# Patient Record
Sex: MALE | Race: WHITE | Employment: FULL TIME | URBAN - METROPOLITAN AREA
[De-identification: names, ages, dates, MRNs, and addresses within clinical notes are randomized per-mention and may not be internally consistent; named-entity substitution may affect disease eponyms.]

---

## 2021-04-21 ENCOUNTER — APPOINTMENT (OUTPATIENT)
Dept: GENERAL RADIOLOGY | Facility: CLINIC | Age: 41
DRG: 871 | End: 2021-04-21
Attending: PHYSICIAN ASSISTANT
Payer: COMMERCIAL

## 2021-04-21 ENCOUNTER — HOSPITAL ENCOUNTER (INPATIENT)
Facility: CLINIC | Age: 41
LOS: 5 days | Discharge: HOME | DRG: 871 | End: 2021-04-26
Attending: PHYSICIAN ASSISTANT | Admitting: HOSPITALIST
Payer: COMMERCIAL

## 2021-04-21 ENCOUNTER — APPOINTMENT (OUTPATIENT)
Dept: CT IMAGING | Facility: CLINIC | Age: 41
DRG: 871 | End: 2021-04-21
Attending: HOSPITALIST
Payer: COMMERCIAL

## 2021-04-21 DIAGNOSIS — I26.94 MULTIPLE SUBSEGMENTAL PULMONARY EMBOLI WITHOUT ACUTE COR PULMONALE (H): Primary | ICD-10-CM

## 2021-04-21 DIAGNOSIS — U07.1 2019 NOVEL CORONAVIRUS DISEASE (COVID-19): ICD-10-CM

## 2021-04-21 DIAGNOSIS — R09.02 HYPOXIA: ICD-10-CM

## 2021-04-21 LAB
ALBUMIN SERPL-MCNC: 3.2 G/DL (ref 3.4–5)
ALP SERPL-CCNC: 63 U/L (ref 40–150)
ALT SERPL W P-5'-P-CCNC: 52 U/L (ref 0–70)
ANION GAP SERPL CALCULATED.3IONS-SCNC: 6 MMOL/L (ref 3–14)
AST SERPL W P-5'-P-CCNC: 45 U/L (ref 0–45)
BASE EXCESS BLDV CALC-SCNC: 5.5 MMOL/L
BASOPHILS # BLD AUTO: 0 10E9/L (ref 0–0.2)
BASOPHILS NFR BLD AUTO: 0 %
BILIRUB SERPL-MCNC: 0.5 MG/DL (ref 0.2–1.3)
BUN SERPL-MCNC: 10 MG/DL (ref 7–30)
CALCIUM SERPL-MCNC: 8.6 MG/DL (ref 8.5–10.1)
CHLORIDE SERPL-SCNC: 102 MMOL/L (ref 94–109)
CO2 SERPL-SCNC: 30 MMOL/L (ref 20–32)
CREAT SERPL-MCNC: 0.78 MG/DL (ref 0.66–1.25)
CRP SERPL-MCNC: 47.5 MG/L (ref 0–8)
D DIMER PPP FEU-MCNC: 4.8 UG/ML FEU (ref 0–0.5)
DIFFERENTIAL METHOD BLD: ABNORMAL
EOSINOPHIL # BLD AUTO: 0 10E9/L (ref 0–0.7)
EOSINOPHIL NFR BLD AUTO: 0 %
ERYTHROCYTE [DISTWIDTH] IN BLOOD BY AUTOMATED COUNT: 12.8 % (ref 10–15)
ERYTHROCYTE [DISTWIDTH] IN BLOOD BY AUTOMATED COUNT: 12.8 % (ref 10–15)
GFR SERPL CREATININE-BSD FRML MDRD: >90 ML/MIN/{1.73_M2}
GLUCOSE SERPL-MCNC: 111 MG/DL (ref 70–99)
HCO3 BLDV-SCNC: 32 MMOL/L (ref 21–28)
HCT VFR BLD AUTO: 47.4 % (ref 40–53)
HCT VFR BLD AUTO: 47.8 % (ref 40–53)
HGB BLD-MCNC: 15.3 G/DL (ref 13.3–17.7)
HGB BLD-MCNC: 15.6 G/DL (ref 13.3–17.7)
INTERPRETATION ECG - MUSE: NORMAL
LACTATE BLD-SCNC: 0.9 MMOL/L (ref 0.7–2)
LACTATE BLD-SCNC: NORMAL MMOL/L (ref 0.7–2)
LYMPHOCYTES # BLD AUTO: 0.8 10E9/L (ref 0.8–5.3)
LYMPHOCYTES NFR BLD AUTO: 23 %
MCH RBC QN AUTO: 28.9 PG (ref 26.5–33)
MCH RBC QN AUTO: 29 PG (ref 26.5–33)
MCHC RBC AUTO-ENTMCNC: 32.3 G/DL (ref 31.5–36.5)
MCHC RBC AUTO-ENTMCNC: 32.6 G/DL (ref 31.5–36.5)
MCV RBC AUTO: 89 FL (ref 78–100)
MCV RBC AUTO: 90 FL (ref 78–100)
MONOCYTES # BLD AUTO: 0.4 10E9/L (ref 0–1.3)
MONOCYTES NFR BLD AUTO: 11 %
NEUTROPHILS # BLD AUTO: 2.3 10E9/L (ref 1.6–8.3)
NEUTROPHILS NFR BLD AUTO: 66 %
O2/TOTAL GAS SETTING VFR VENT: ABNORMAL %
PCO2 BLDV: 50 MM HG (ref 40–50)
PH BLDV: 7.41 PH (ref 7.32–7.43)
PLATELET # BLD AUTO: 143 10E9/L (ref 150–450)
PLATELET # BLD AUTO: 146 10E9/L (ref 150–450)
PLATELET # BLD EST: ABNORMAL 10*3/UL
PO2 BLDV: 22 MM HG (ref 25–47)
POTASSIUM SERPL-SCNC: 3.2 MMOL/L (ref 3.4–5.3)
POTASSIUM SERPL-SCNC: 3.6 MMOL/L (ref 3.4–5.3)
PROT SERPL-MCNC: 7.7 G/DL (ref 6.8–8.8)
RBC # BLD AUTO: 5.28 10E12/L (ref 4.4–5.9)
RBC # BLD AUTO: 5.4 10E12/L (ref 4.4–5.9)
RBC MORPH BLD: ABNORMAL
SODIUM SERPL-SCNC: 138 MMOL/L (ref 133–144)
TROPONIN I SERPL-MCNC: <0.015 UG/L (ref 0–0.04)
WBC # BLD AUTO: 3.2 10E9/L (ref 4–11)
WBC # BLD AUTO: 3.5 10E9/L (ref 4–11)

## 2021-04-21 PROCEDURE — 250N000009 HC RX 250: Performed by: HOSPITALIST

## 2021-04-21 PROCEDURE — 99223 1ST HOSP IP/OBS HIGH 75: CPT | Mod: AI | Performed by: HOSPITALIST

## 2021-04-21 PROCEDURE — 85379 FIBRIN DEGRADATION QUANT: CPT | Performed by: PHYSICIAN ASSISTANT

## 2021-04-21 PROCEDURE — 99285 EMERGENCY DEPT VISIT HI MDM: CPT | Mod: 25

## 2021-04-21 PROCEDURE — 84132 ASSAY OF SERUM POTASSIUM: CPT | Performed by: HOSPITALIST

## 2021-04-21 PROCEDURE — 250N000011 HC RX IP 250 OP 636: Performed by: HOSPITALIST

## 2021-04-21 PROCEDURE — 83605 ASSAY OF LACTIC ACID: CPT | Performed by: PHYSICIAN ASSISTANT

## 2021-04-21 PROCEDURE — 96374 THER/PROPH/DIAG INJ IV PUSH: CPT

## 2021-04-21 PROCEDURE — 86140 C-REACTIVE PROTEIN: CPT | Performed by: PHYSICIAN ASSISTANT

## 2021-04-21 PROCEDURE — 85027 COMPLETE CBC AUTOMATED: CPT | Performed by: HOSPITALIST

## 2021-04-21 PROCEDURE — 82803 BLOOD GASES ANY COMBINATION: CPT | Performed by: PHYSICIAN ASSISTANT

## 2021-04-21 PROCEDURE — 120N000001 HC R&B MED SURG/OB

## 2021-04-21 PROCEDURE — 250N000011 HC RX IP 250 OP 636: Performed by: EMERGENCY MEDICINE

## 2021-04-21 PROCEDURE — 84484 ASSAY OF TROPONIN QUANT: CPT | Performed by: PHYSICIAN ASSISTANT

## 2021-04-21 PROCEDURE — 93005 ELECTROCARDIOGRAM TRACING: CPT

## 2021-04-21 PROCEDURE — 71275 CT ANGIOGRAPHY CHEST: CPT

## 2021-04-21 PROCEDURE — 71045 X-RAY EXAM CHEST 1 VIEW: CPT

## 2021-04-21 PROCEDURE — XW033E5 INTRODUCTION OF REMDESIVIR ANTI-INFECTIVE INTO PERIPHERAL VEIN, PERCUTANEOUS APPROACH, NEW TECHNOLOGY GROUP 5: ICD-10-PCS | Performed by: HOSPITALIST

## 2021-04-21 PROCEDURE — 258N000003 HC RX IP 258 OP 636: Performed by: HOSPITALIST

## 2021-04-21 PROCEDURE — 80053 COMPREHEN METABOLIC PANEL: CPT | Performed by: PHYSICIAN ASSISTANT

## 2021-04-21 PROCEDURE — 36415 COLL VENOUS BLD VENIPUNCTURE: CPT | Performed by: HOSPITALIST

## 2021-04-21 PROCEDURE — 85025 COMPLETE CBC W/AUTO DIFF WBC: CPT | Performed by: PHYSICIAN ASSISTANT

## 2021-04-21 PROCEDURE — 250N000013 HC RX MED GY IP 250 OP 250 PS 637: Performed by: EMERGENCY MEDICINE

## 2021-04-21 RX ORDER — ACETAMINOPHEN 325 MG/1
650 TABLET ORAL EVERY 4 HOURS PRN
Status: DISCONTINUED | OUTPATIENT
Start: 2021-04-21 | End: 2021-04-26 | Stop reason: HOSPADM

## 2021-04-21 RX ORDER — ALBUTEROL SULFATE 90 UG/1
2 AEROSOL, METERED RESPIRATORY (INHALATION) EVERY 6 HOURS PRN
Status: DISCONTINUED | OUTPATIENT
Start: 2021-04-21 | End: 2021-04-26 | Stop reason: HOSPADM

## 2021-04-21 RX ORDER — ONDANSETRON 4 MG/1
4 TABLET, ORALLY DISINTEGRATING ORAL EVERY 6 HOURS PRN
Status: DISCONTINUED | OUTPATIENT
Start: 2021-04-21 | End: 2021-04-26 | Stop reason: HOSPADM

## 2021-04-21 RX ORDER — GUAIFENESIN 200 MG/1
200 TABLET ORAL EVERY 4 HOURS PRN
Status: DISCONTINUED | OUTPATIENT
Start: 2021-04-21 | End: 2021-04-21

## 2021-04-21 RX ORDER — BENZONATATE 100 MG/1
100 CAPSULE ORAL 3 TIMES DAILY PRN
Status: DISCONTINUED | OUTPATIENT
Start: 2021-04-21 | End: 2021-04-26 | Stop reason: HOSPADM

## 2021-04-21 RX ORDER — ONDANSETRON 2 MG/ML
4 INJECTION INTRAMUSCULAR; INTRAVENOUS EVERY 6 HOURS PRN
Status: DISCONTINUED | OUTPATIENT
Start: 2021-04-21 | End: 2021-04-26 | Stop reason: HOSPADM

## 2021-04-21 RX ORDER — DEXAMETHASONE SODIUM PHOSPHATE 4 MG/ML
6 INJECTION, SOLUTION INTRA-ARTICULAR; INTRALESIONAL; INTRAMUSCULAR; INTRAVENOUS; SOFT TISSUE ONCE
Status: COMPLETED | OUTPATIENT
Start: 2021-04-21 | End: 2021-04-21

## 2021-04-21 RX ORDER — TOPIRAMATE 50 MG/1
50 TABLET, FILM COATED ORAL 2 TIMES DAILY
COMMUNITY
Start: 2021-03-18

## 2021-04-21 RX ORDER — POTASSIUM CHLORIDE 1500 MG/1
40 TABLET, EXTENDED RELEASE ORAL ONCE
Status: COMPLETED | OUTPATIENT
Start: 2021-04-21 | End: 2021-04-21

## 2021-04-21 RX ORDER — IOPAMIDOL 755 MG/ML
500 INJECTION, SOLUTION INTRAVASCULAR ONCE
Status: COMPLETED | OUTPATIENT
Start: 2021-04-21 | End: 2021-04-21

## 2021-04-21 RX ORDER — DEXAMETHASONE SODIUM PHOSPHATE 4 MG/ML
6 INJECTION, SOLUTION INTRA-ARTICULAR; INTRALESIONAL; INTRAMUSCULAR; INTRAVENOUS; SOFT TISSUE EVERY 24 HOURS
Status: DISCONTINUED | OUTPATIENT
Start: 2021-04-22 | End: 2021-04-24

## 2021-04-21 RX ORDER — POTASSIUM CHLORIDE 1500 MG/1
40 TABLET, EXTENDED RELEASE ORAL ONCE
Status: DISCONTINUED | OUTPATIENT
Start: 2021-04-21 | End: 2021-04-21

## 2021-04-21 RX ORDER — LOSARTAN POTASSIUM 50 MG/1
50 TABLET ORAL DAILY
COMMUNITY
Start: 2021-03-18

## 2021-04-21 RX ORDER — ALBUTEROL SULFATE 90 UG/1
1-2 AEROSOL, METERED RESPIRATORY (INHALATION) EVERY 4 HOURS PRN
COMMUNITY
Start: 2021-04-19

## 2021-04-21 RX ORDER — HEPARIN SODIUM 10000 [USP'U]/100ML
0-5000 INJECTION, SOLUTION INTRAVENOUS CONTINUOUS
Status: DISCONTINUED | OUTPATIENT
Start: 2021-04-21 | End: 2021-04-25

## 2021-04-21 RX ORDER — LIDOCAINE 40 MG/G
CREAM TOPICAL
Status: DISCONTINUED | OUTPATIENT
Start: 2021-04-21 | End: 2021-04-26 | Stop reason: HOSPADM

## 2021-04-21 RX ADMIN — SODIUM CHLORIDE 90 ML: 9 INJECTION, SOLUTION INTRAVENOUS at 17:35

## 2021-04-21 RX ADMIN — REMDESIVIR 200 MG: 100 INJECTION, POWDER, LYOPHILIZED, FOR SOLUTION INTRAVENOUS at 17:47

## 2021-04-21 RX ADMIN — POTASSIUM CHLORIDE 40 MEQ: 1500 TABLET, EXTENDED RELEASE ORAL at 13:53

## 2021-04-21 RX ADMIN — SODIUM CHLORIDE 50 ML: 9 INJECTION, SOLUTION INTRAVENOUS at 17:50

## 2021-04-21 RX ADMIN — IOPAMIDOL 90 ML: 755 INJECTION, SOLUTION INTRAVENOUS at 17:34

## 2021-04-21 RX ADMIN — DEXAMETHASONE SODIUM PHOSPHATE 6 MG: 4 INJECTION, SOLUTION INTRAMUSCULAR; INTRAVENOUS at 13:53

## 2021-04-21 RX ADMIN — HEPARIN SODIUM 1800 UNITS/HR: 10000 INJECTION, SOLUTION INTRAVENOUS at 22:18

## 2021-04-21 RX ADMIN — ENOXAPARIN SODIUM 40 MG: 40 INJECTION SUBCUTANEOUS at 17:10

## 2021-04-21 ASSESSMENT — ENCOUNTER SYMPTOMS
CHILLS: 0
APPETITE CHANGE: 0
FEVER: 0
SHORTNESS OF BREATH: 1

## 2021-04-21 ASSESSMENT — MIFFLIN-ST. JEOR
SCORE: 2517.42
SCORE: 2511.98

## 2021-04-21 ASSESSMENT — ACTIVITIES OF DAILY LIVING (ADL)
ADLS_ACUITY_SCORE: 15
ADLS_ACUITY_SCORE: 15

## 2021-04-21 NOTE — PHARMACY-ADMISSION MEDICATION HISTORY
Admission medication history interview status for this patient is complete. See [facility] admission navigator for allergy information, prior to admission medications and immunization status.     Medication history interview source(s):Patient  Medication history resources (including written lists, pill bottles, clinic record):None    Changes made to PTA medication list:  Added: all meds  Deleted: none  Changed: none    Actions taken by pharmacist (provider contacted, etc):None     Additional medication history information:None    Medication reconciliation/reorder completed by provider prior to medication history? No    Prior to Admission medications    Medication Sig Last Dose Taking? Auth Provider   albuterol (PROAIR HFA/PROVENTIL HFA/VENTOLIN HFA) 108 (90 Base) MCG/ACT inhaler Inhale 1-2 puffs into the lungs every 4 hours as needed 4/21/2021 at am Yes Unknown, Entered By History   losartan (COZAAR) 50 MG tablet Take 50 mg by mouth daily 4/20/2021 Yes Unknown, Entered By History   topiramate (TOPAMAX) 50 MG tablet Take 50 mg by mouth 2 times daily Past Week Yes Unknown, Entered By History

## 2021-04-21 NOTE — ED TRIAGE NOTES
Pt arrives to the ED due to low o2 sats at home. Pt states he tested positive for COVID 1 week ago. Wife states that his O2 sats at home were around 84% at rest. Pt does endorse feeling more SOB. States no fevers for past 2 days. Denies h/o of asthma or COPD. Denies chest pain.

## 2021-04-21 NOTE — PLAN OF CARE
Pt admitted from ER on a cart. Got up off the cart and walked to the bed. Dyspnea noted. BP high, afeb, denied pain. Oriented to room, equipment, orders and floor routines. Covid meds ordered. Hospitalist did order a chest CT since pt recently drove to [place] and back. D-dimer elevated as well. Pt has not eaten yet today and did not take any of his meds. He reported a 20lb wt loss in the last week. O2 increased to 3L from 2L NC with continuous pulse ox at 90 to get to 92%.

## 2021-04-21 NOTE — ED NOTES
[facility]  ED Nurse Handoff Report    Cooper Adair is a 40 year old male   ED Chief complaint: Covid Concern  . ED Diagnosis:   Final diagnoses:   2019 novel coronavirus disease (COVID-19)   Hypoxia     Allergies: No Known Allergies    Code Status: Full Code  Activity level - Baseline/Home:  Independent. Activity Level - Current:   Stand by Assist. Lift room needed: No. Bariatric: No   Needed: No   Isolation: Yes. Infection: COVID positive.  .     Vital Signs:   Vitals:    04/21/21 1149 04/21/21 1230 04/21/21 1245   BP: (!) 153/98 (!) 158/102 (!) 155/104   Pulse: 104 91 98   Resp: (!) 36     Temp: 97.6  F (36.4  C)     TempSrc: Oral     SpO2: 92% (!) 88% 94%   Weight: (!) 151 kg (333 lb)         Cardiac Rhythm:  ,      Pain level:    Patient confused: No. Patient Falls Risk: Yes.   Elimination Status: Due to void.   Patient Report - Initial Complaint: SOB, COVID +. Focused Assessment: Presents to ED with hypoxia and increasing SOB. Pt tested positive for COVID one week ago. Oxygen saturations at home were mid-80s. Pt placed on 2L via NC and O2 saturations have now been 94-96%. Pt has no other complaints.    Tests Performed: Labs, EKG, xray. Abnormal Results:   Labs Ordered and Resulted from Time of ED Arrival Up to the Time of Departure from the ED   BLOOD GAS VENOUS - Abnormal; Notable for the following components:       Result Value    PO2 Venous 22 (*)     Bicarbonate Venous 32 (*)     All other components within normal limits   CBC WITH PLATELETS DIFFERENTIAL - Abnormal; Notable for the following components:    WBC 3.5 (*)     Platelet Count 143 (*)     All other components within normal limits   COMPREHENSIVE METABOLIC PANEL - Abnormal; Notable for the following components:    Potassium 3.2 (*)     Glucose 111 (*)     Albumin 3.2 (*)     All other components within normal limits   TROPONIN I   LACTIC ACID WHOLE BLOOD   LACTIC ACID WHOLE BLOOD   CRP INFLAMMATION   D DIMER  QUANTITATIVE   VITAL SIGNS   PULSE OXIMETRY NURSING   CARDIAC CONTINUOUS MONITORING   PERIPHERAL IV CATHETER   NOTIFY PHYSICIAN     XR Chest Port 1 View   Final Result   IMPRESSION: Single portable AP view of the chest was obtained.   Cardiomediastinal silhouette is within normal limits. Bilateral   basilar predominant pulmonary opacities, worrisome for infection   including atypical infection like COVID-19. No significant pleural   effusion or pneumothorax.       BETY RITCHIE MD        .   Treatments provided: See MAR.  Family Comments: No family here.   OBS brochure/video discussed/provided to patient:  N/A  ED Medications:   Medications   sodium chloride (PF) 0.9% PF flush 3 mL (has no administration in time range)   sodium chloride (PF) 0.9% PF flush 3 mL (has no administration in time range)   dexamethasone (DECADRON) injection 6 mg (6 mg Intravenous Given 4/21/21 1353)   potassium chloride ER (KLOR-CON M) CR tablet 40 mEq (40 mEq Oral Given 4/21/21 1353)     Drips infusing:  No  For the majority of the shift, the patient's behavior Green. Interventions performed were N/A.    Sepsis treatment initiated: No     Patient tested for COVID 19 prior to admission: NO - positive    ED Nurse Name/Phone Number: Adelaida Mccullough RN,   2:10 PM     RECEIVING UNIT ED HANDOFF REVIEW    Above ED Nurse Handoff Report was reviewed: Yes  Reviewed by: Renetta Carnes RN on April 21, 2021 at 2:28 PM

## 2021-04-21 NOTE — ED PROVIDER NOTES
Emergency Department Attending Supervision Note  4/21/2021  1:47 PM      I evaluated this patient in conjunction with Chrissy Kong PA-C.    Briefly, this 40 year old man with HTN presented with shortness of breath. He tested positive for COVID-19 1 week ago. He has had worsening shortness of breath and home pulse oximeter today read 84% today at rest prompting his visit. He has not had fever in the last couple days. He denies chest pain. He has had no vomiting, but some diarrhea.     On my exam,   General: Well-developed and well-nourished. Well appearing middle aged  man. Cooperative.  Head:  Atraumatic.  Eyes:  Conjunctivae, lids, and sclerae are normal.  Neck:  Supple. Normal range of motion.  CV:  Regular rate and rhythm. Normal heart sounds with no murmurs, rubs, or gallops detected.  Resp:  No respiratory distress. Clear to auscultation bilaterally without decreased breath sounds, wheezing, rales, or rhonchi.  GI:  Soft. Non-distended. Non-tender.    MS:  Normal ROM.   Skin:  Warm. Non-diaphoretic. No pallor.  Neuro:  Awake. A&Ox3. Normal strength.  Psych: Normal mood and affect. Normal speech.  Vitals reviewed.    Results:  Abnormal values below only. See results review for all others.   Labs Ordered and Resulted from Time of ED Arrival Up to the Time of Departure from the ED   BLOOD GAS VENOUS - Abnormal; Notable for the following components:       Result Value    PO2 Venous 22 (*)     Bicarbonate Venous 32 (*)     All other components within normal limits   CBC WITH PLATELETS DIFFERENTIAL - Abnormal; Notable for the following components:    WBC 3.5 (*)     Platelet Count 143 (*)     All other components within normal limits   COMPREHENSIVE METABOLIC PANEL - Abnormal; Notable for the following components:    Potassium 3.2 (*)     Glucose 111 (*)     Albumin 3.2 (*)     All other components within normal limits   TROPONIN I   LACTIC ACID WHOLE BLOOD      XR Chest Port 1 View   Final Result    IMPRESSION: Single portable AP view of the chest was obtained.   Cardiomediastinal silhouette is within normal limits. Bilateral   basilar predominant pulmonary opacities, worrisome for infection   including atypical infection like COVID-19. No significant pleural   effusion or pneumothorax.       BETY RITCHIE MD        MDM:  This 40 year old man with COVID-19 presents with worsening dyspnea and home oximeter revealing hypoxia. He appears well but is hypoxic to 88% on room air here requiring supplemental oxygen. He is in no respiratory distress and is stable. His workup reveals evidence of COVID-19 pneumonia on CXR, as expected, without evidence of superimposed bacterial infection that would require antibiotics. He has leukopenia typical of COVID-19. There is no kidney injury, lactic acidosis, hepatitis, biliary obstruction, and only mild hypokalemia repleted with oral potassium. He was given Decadron and understands plan for admission. His EKG is without acute ST changes or arrhythmia and troponin is undetectable. Cooper was admitted to the hospitalist service by HEMALATHA Kong. The hospitalist will follow up on pending d-dimer. I answered all Cooper's questions prior to admission.    Diagnosis:  1. Hypoxic respiratory failure   2. COVID-19 PNA  3. Hypokalemia  4. Leukopenia    Maryann Cordova MD    Scribe Disclosure:  I, Orla Severson, am serving as a scribe at 1:48 PM on 4/21/2021 to document services personally performed by Maryann Cordova MD based on my observations and the provider's statements to me.          Maryann Cordova MD  04/22/21 6503

## 2021-04-21 NOTE — PLAN OF CARE
Pt up indep but very FAROOQ. 4 L NC. CT showed PE's. Covid cocktail except changing lovenox to heparin drip. Denies pain. Will continue to monitor.     Attempted to cover low K+ but order cancelled bc ER gave K+ replacement earlier today. Will recheck in morning.

## 2021-04-21 NOTE — H&P
[facility]    History and Physical  Hospitalist       Date of Admission:  4/21/2021    Assessment & Plan   Cooper Adair is a 40 year old male with a past medical history of JAREN and hypertension who presents with shortness of breath.    #Acute hypoxemic respiratory failure and sepsis secondary to Covid-19: Traveled to [place] about 2 weeks ago.  Had symptoms of generalized malaise that started 10 days PTA.  Patient tested positive for Covid-19 on 04/14.  Endorses shortness of breath that is been progressive.  Cough, generalized malaise.  He checked his oxygen saturation at home and was 84%.  Presented to the ED for evaluation.  -ED, patient afebrile, tachycardic and tachypneic.  Requiring 2 L on nasal cannula.  BMP showed only mild hypokalemia.  Troponin negative.  LFTs within normal limits.  VBG unremarkable.  CBC showed mild thrombocytopenia and leukopenia.  Chest x-ray showed bilateral opacities.  Patient given dexamethasone.  -Dexamethasone and remdesivir started on admission.  -Enoxaparin for ppx  -albuterol prn, tessalon and guaifenesin prn.    -Continuous pulse oximetry.  Monitor inflammatory markers.  I have added on CRP and d dimer.  -Maintain precautions    Addendum: D dimer is quite elevated at 4.8.  Given COVID-19 positive status in setting of long drive back from [place], will check CT PE.  If negative will check LE doppers to ensure to DVT.     Paged by radiology that CT PE showed moderate volume acute PE and changes consistent with COVID-19 preliminarily.  Will start full dose anticoagulation, pharm liason for DOAC coverage.  I will also order a TTE.  I did communicate results and plan with the patient, bedside nurse and pharmacy.     #JAREN: Continue home CPAP  #HTN: Continue home losartan    DVT Prophylaxis: Enoxaparin (Lovenox) SQ  Code Status: Full Code  Dispo: Admit to inpatient    Jeff Varghese MD    Primary Care Physician   Chrissy Maguire    Chief Complaint    SOB    History is obtained from the patient, patient's chart, ER physician    History of Present Illness   Cooper Adair is a 40 year old male with a past medical history of JAREN and hypertension who presents with shortness of breath.    Cooper recently got back from [place] about a week and a half ago or so.  He drove there and back.  He tracks with family.  He had symptoms of generalized malaise and fatigue that started about 10 days ago.  He tested positive for Covid-19 about 1 week ago.  He has had progressive shortness of breath, cough and generalized malaise.  He checked his oxygen saturations today and they were 84% at rest.  He presented to the ED for evaluation.  He denies any chest pain.  He has albuterol inhaler prescribed but no history of asthma or COPD.  No nausea vomiting or diarrhea.    In the ED, patient afebrile, tachycardic and tachypneic.  Requiring 2 L on nasal cannula.  BMP showed only mild hypokalemia.  Troponin negative.  LFTs within normal limits.  VBG unremarkable.  CBC showed mild thrombocytopenia and leukopenia.  Chest x-ray showed bilateral opacities.  Patient given dexamethasone.    Past Medical History    I have reviewed this patient's medical history and updated it with pertinent information if needed.   Obesity  JAREN  Hypertension  Pyogenic granuloma  Cellulitis    Past Surgical History   I have reviewed this patient's surgical history and updated it with pertinent information if needed.  None    Prior to Admission Medications   None   Albuterol  Cozaar  Topamax    Allergies   No Known Allergies    Social History   I have reviewed this patient's social history and updated it with pertinent information if needed. Cooper Adair    Nonsmoker, nondrinker. Lives with wife and kids.      Family History   I have reviewed this patient's family history and updated it with pertinent information if needed.   Noncontributory - DM and JAREN    Review of Systems   The 10 point  Review of Systems is negative other than noted in the HPI or here.     Physical Exam   Temp: 97.6  F (36.4  C) Temp src: Oral BP: (!) 155/104 Pulse: 98   Resp: (!) 36 SpO2: 94 % O2 Device: Nasal cannula Oxygen Delivery: 2 LPM  Vital Signs with Ranges  Temp:  [97.6  F (36.4  C)] 97.6  F (36.4  C)  Pulse:  [] 98  Resp:  [36] 36  BP: (153-158)/() 155/104  SpO2:  [88 %-94 %] 94 %  333 lbs 0 oz    Constitutional: Obese. Fatigued but conversational   HEENT: Normocephalic, MMM, no elevation of JVD noted  Respiratory: +tachypnea. Coarse at bases. No wheeze  Cardiovascular: Regular, no murmur  GI: BS+, NT, ND, no rebound or guarding  Lymph/Hematologic: No bruising. No cervical LAD  Skin: No rash  Musculoskeletal: Nl Tone, No edema noted  Neurologic: A&Ox3, Answers appropriately. CNII-XII intact. Moves all extremities. No tremor  Psychiatric: Calm    Data   Data reviewed today:  I personally reviewed   Recent Labs   Lab 04/21/21  1240   WBC 3.5*   HGB 15.3   MCV 90   *      POTASSIUM 3.2*   CHLORIDE 102   CO2 30   BUN 10   CR 0.78   ANIONGAP 6   CIRILO 8.6   *   ALBUMIN 3.2*   PROTTOTAL 7.7   BILITOTAL 0.5   ALKPHOS 63   ALT 52   AST 45   TROPI <0.015       Recent Results (from the past 24 hour(s))   XR Chest Port 1 View    Narrative    CHEST PORTABLE ONE VIEW    4/21/2021 12:55 PM     HISTORY: Dyspnea/SOB, known COVID +.    COMPARISON: None available.      Impression    IMPRESSION: Single portable AP view of the chest was obtained.  Cardiomediastinal silhouette is within normal limits. Bilateral  basilar predominant pulmonary opacities, worrisome for infection  including atypical infection like COVID-19. No significant pleural  effusion or pneumothorax.     BETY RITCHIE MD

## 2021-04-21 NOTE — ED PROVIDER NOTES
History   Chief Complaint:  Shortness of Breath      HPI   Cooper Adair is a 40 year old male with history of hypertension, JAREN, and positive COVID test 1 week ago who presents with shortness of breath. The patient reports O2 sats of 84% at rest today, with associated shortness of breath with exertion. He denies any fever or chills in the past 2 days. No chest pain. No history of asthma or COPD. States he has been eating and drinking okay. Proof of positive COVID test below.         Review of Systems   Constitutional: Negative for appetite change, chills and fever.   Respiratory: Positive for shortness of breath.    Cardiovascular: Negative for chest pain.   All other systems reviewed and are negative.      Allergies:  The patient has no known allergies.     Medications:  Albuterol  Cozaar  Topamax    Past Medical History:    Obesity  JAREN  Hypertension  Pyogenic granuloma  Cellulitis    Past Surgical History:    Appendectomy  Mass excision     Family History:    DM  Sleep apnea    Social History:  Patient presents alone.  Marital status:     Physical Exam     Patient Vitals for the past 24 hrs:   BP Temp Temp src Pulse Resp SpO2 Weight   04/21/21 1245 (!) 155/104 -- -- 98 -- 94 % --   04/21/21 1230 (!) 158/102 -- -- 91 -- (!) 88 % --   04/21/21 1149 (!) 153/98 97.6  F (36.4  C) Oral 104 (!) 36 92 % (!) 151 kg (333 lb)       Physical Exam  General: Sitting upright, well appearing.   Head:  Scalp is atraumatic.  Eyes:   Normal conjunctiva.   ENT:                                      Ears:  The external ears are normal.   Nose:  The external nose is normal.  Throat:  The oropharynx is normal. Mucus membranes are moist.                 Neck:  Normal range of motion.   CV:  Tachycardic rate. No murmur. 2+ radial pulses  Resp:  Course breath sounds bilaterally. Mild respiratory distress.   GI:  Abdomen is soft, no distension, no tenderness.   MS:  Normal range of motion. No acute deformities.    Skin:  Warm and dry. No rash.   Neuro:  Alert. Strength and sensation grossly intact.   Psych:  Awake. Alert.  Appropriate interactions.     Emergency Department Course     ECG  ECG taken at 1209, ECG read at 1219  NSR, Normal ECG   Rate 93 bpm. ME interval 148 ms. QRS duration 82 ms. QT/QTc 368/457 ms. P-R-T axes 60 32 51.     Imaging:    XR Chest Port 1 View:  Single portable AP view of the chest was obtained.   Cardiomediastinal silhouette is within normal limits. Bilateral   basilar predominant pulmonary opacities, worrisome for infection   including atypical infection like COVID-19. No significant pleural   effusion or pneumothorax, as per radiology.     Laboratory:    CBC: WBC: 3.5 (L), HGB: 15.3, PLT: 143 (L)  CMP: Glucose 111 (H), Potassium: 3.2 (L), Albumin: 3.2 (L), o/w WNL (Creatinine: 0.78)    Troponin (Collected 1240): <0.015  Lactic acid: Pending  D-dimer: pending   VBG: pH 7.41 / PCO2 50 / PO2 22 (L) / Bicarb 32 (H)    Emergency Department Course:    Reviewed:  I reviewed the patient's nursing notes, vitals, past medical records, Care Everywhere.     Assessments:  1218    I evaluated the patient and performed an exam as above.    1405    I updated the patient on results and discussed plan of care.    Consults:   1403    I spoke with Dr. Varghese of the Hospitalist service regarding patient's presentation, findings, and plan of care.    Interventions:  1353 Decadron, 6 mg, IV   Klor-con, 40 mEq, Oral     Disposition:  The patient was admitted to the hospital under the care of Dr. Varghese.     Impression & Plan       Medical Decision Making:  Cooper Adair is a 40 year old male presents emergency department after being diagnosed with COVID-19 virus 1 week ago with worsening shortness of breath and low O2 sats at home.  No chest pain.  Here, O2 sats in the high 80s.  No leukocytosis and chest x-ray with no evidence of lobar pneumonia to suggest bacterial pneumonia.  EKG with no ischemic changes.   Troponin negative.  He has no chest pain and I doubt acute coronary syndrome.  Pulmonary embolism considered, but I believe this is less likely.  Hospitalist will add on D-dimer and follow accordingly.  Patient required O2 via nasal cannula in the emergency department.  Dose of Decadron given here.  Potassium was a little low at 3.2 and dose of potassium given.  Given need for supplemental oxygen in the setting of COVID-19, will admit under the care of Dr. Simmons for further care and monitoring.  Patient agrees with this plan all questions and concerns addressed prior to admission.     Covid-19  Cooper Adair was evaluated during a global COVID-19 pandemic, which necessitated consideration that the patient might be at risk for infection with the SARS-CoV-2 virus that causes COVID-19.   Applicable protocols for evaluation were followed during the patient's care.   COVID-19 was considered as part of the patient's evaluation. The plan for testing is:  a test was obtained at a previous visit and reviewed & considered today.    Diagnosis:    ICD-10-CM    1. 2019 novel coronavirus disease (COVID-19)  U07.1 Lactic acid whole blood     Lactic acid whole blood   2. Hypoxia  R09.02        Scribe Disclosure:  I, Monisha Avila, am serving as a scribe at 12:18 PM on 4/21/2021 to document services personally performed by Chrissy Kong PA-C based on my observations and the provider's statements to me.      Chrissy Kong PA-C  04/21/21 0252

## 2021-04-22 ENCOUNTER — APPOINTMENT (OUTPATIENT)
Dept: CARDIOLOGY | Facility: CLINIC | Age: 41
DRG: 871 | End: 2021-04-22
Attending: HOSPITALIST
Payer: COMMERCIAL

## 2021-04-22 LAB
ANION GAP SERPL CALCULATED.3IONS-SCNC: 4 MMOL/L (ref 3–14)
BUN SERPL-MCNC: 13 MG/DL (ref 7–30)
CALCIUM SERPL-MCNC: 8.8 MG/DL (ref 8.5–10.1)
CHLORIDE SERPL-SCNC: 106 MMOL/L (ref 94–109)
CO2 SERPL-SCNC: 28 MMOL/L (ref 20–32)
CREAT SERPL-MCNC: 0.73 MG/DL (ref 0.66–1.25)
CRP SERPL-MCNC: 31.9 MG/L (ref 0–8)
ERYTHROCYTE [DISTWIDTH] IN BLOOD BY AUTOMATED COUNT: 13 % (ref 10–15)
GFR SERPL CREATININE-BSD FRML MDRD: >90 ML/MIN/{1.73_M2}
GLUCOSE SERPL-MCNC: 116 MG/DL (ref 70–99)
HCT VFR BLD AUTO: 48.3 % (ref 40–53)
HGB BLD-MCNC: 15.6 G/DL (ref 13.3–17.7)
MCH RBC QN AUTO: 29.2 PG (ref 26.5–33)
MCHC RBC AUTO-ENTMCNC: 32.3 G/DL (ref 31.5–36.5)
MCV RBC AUTO: 90 FL (ref 78–100)
PLATELET # BLD AUTO: 129 10E9/L (ref 150–450)
POTASSIUM SERPL-SCNC: 4.1 MMOL/L (ref 3.4–5.3)
RBC # BLD AUTO: 5.34 10E12/L (ref 4.4–5.9)
SODIUM SERPL-SCNC: 138 MMOL/L (ref 133–144)
UFH PPP CHRO-ACNC: 0.41 IU/ML
UFH PPP CHRO-ACNC: 0.43 IU/ML
UFH PPP CHRO-ACNC: 0.74 IU/ML
WBC # BLD AUTO: 3.2 10E9/L (ref 4–11)

## 2021-04-22 PROCEDURE — 93321 DOPPLER ECHO F-UP/LMTD STD: CPT | Mod: 26 | Performed by: INTERNAL MEDICINE

## 2021-04-22 PROCEDURE — 80048 BASIC METABOLIC PNL TOTAL CA: CPT | Performed by: HOSPITALIST

## 2021-04-22 PROCEDURE — 258N000003 HC RX IP 258 OP 636: Performed by: HOSPITALIST

## 2021-04-22 PROCEDURE — 85520 HEPARIN ASSAY: CPT | Performed by: HOSPITALIST

## 2021-04-22 PROCEDURE — 93321 DOPPLER ECHO F-UP/LMTD STD: CPT

## 2021-04-22 PROCEDURE — 94660 CPAP INITIATION&MGMT: CPT

## 2021-04-22 PROCEDURE — 120N000001 HC R&B MED SURG/OB

## 2021-04-22 PROCEDURE — 85520 HEPARIN ASSAY: CPT | Performed by: INTERNAL MEDICINE

## 2021-04-22 PROCEDURE — 36415 COLL VENOUS BLD VENIPUNCTURE: CPT | Performed by: INTERNAL MEDICINE

## 2021-04-22 PROCEDURE — 255N000002 HC RX 255 OP 636: Performed by: HOSPITALIST

## 2021-04-22 PROCEDURE — 86140 C-REACTIVE PROTEIN: CPT | Performed by: HOSPITALIST

## 2021-04-22 PROCEDURE — 250N000011 HC RX IP 250 OP 636: Performed by: HOSPITALIST

## 2021-04-22 PROCEDURE — 93325 DOPPLER ECHO COLOR FLOW MAPG: CPT | Mod: 26 | Performed by: INTERNAL MEDICINE

## 2021-04-22 PROCEDURE — 99233 SBSQ HOSP IP/OBS HIGH 50: CPT | Performed by: INTERNAL MEDICINE

## 2021-04-22 PROCEDURE — 85027 COMPLETE CBC AUTOMATED: CPT | Performed by: HOSPITALIST

## 2021-04-22 PROCEDURE — 36415 COLL VENOUS BLD VENIPUNCTURE: CPT | Performed by: HOSPITALIST

## 2021-04-22 PROCEDURE — 999N000105 HC STATISTIC NO DOCUMENTATION TO SUPPORT CHARGE

## 2021-04-22 PROCEDURE — 999N000157 HC STATISTIC RCP TIME EA 10 MIN

## 2021-04-22 PROCEDURE — 93308 TTE F-UP OR LMTD: CPT | Mod: 26 | Performed by: INTERNAL MEDICINE

## 2021-04-22 PROCEDURE — 250N000009 HC RX 250: Performed by: HOSPITALIST

## 2021-04-22 RX ADMIN — SODIUM CHLORIDE 50 ML: 9 INJECTION, SOLUTION INTRAVENOUS at 19:05

## 2021-04-22 RX ADMIN — HEPARIN SODIUM 1700 UNITS/HR: 10000 INJECTION, SOLUTION INTRAVENOUS at 10:47

## 2021-04-22 RX ADMIN — HUMAN ALBUMIN MICROSPHERES AND PERFLUTREN 2 ML: 10; .22 INJECTION, SOLUTION INTRAVENOUS at 12:05

## 2021-04-22 RX ADMIN — DEXAMETHASONE SODIUM PHOSPHATE 6 MG: 4 INJECTION, SOLUTION INTRAMUSCULAR; INTRAVENOUS at 08:23

## 2021-04-22 RX ADMIN — HEPARIN SODIUM 1700 UNITS/HR: 10000 INJECTION, SOLUTION INTRAVENOUS at 06:19

## 2021-04-22 RX ADMIN — REMDESIVIR 100 MG: 100 INJECTION, POWDER, LYOPHILIZED, FOR SOLUTION INTRAVENOUS at 19:05

## 2021-04-22 ASSESSMENT — ACTIVITIES OF DAILY LIVING (ADL)
ADLS_ACUITY_SCORE: 15

## 2021-04-22 NOTE — PLAN OF CARE
Afeb, vss, sats 93-95 on 2L NC after respiratory changed him from Bipap to the NC so he could eat and drink. Up with sba of 1 to the BR. He felt he was much better than yesterday when up. Heparin drip continues at same rate (1700 unit(s)/h ) the heparin level at 1215 in therapeutic range. Recheck scheduled for 1715.  spoke to pt and his wife about treatment plan and expected length of stay.

## 2021-04-22 NOTE — PROGRESS NOTES
Notified by RN that pt was desatting on home CPAP with 11 L O2 bleed in. Pt placed on V60 with CPAP +14 (per patients home settings) and 50% FIO2. RT will monitor.    Cristina Khan RT

## 2021-04-22 NOTE — PHARMACY-RX INSURANCE COVERAGE
Anticoagulation coverage check.  Patient has [employer] through an employer with $5846 (of $6000) deductible remaining.     Xarelto:  $488/mo. Upon receipt of RX [facility] can provide copay savings card to reduce this to $10/mo for the first two fills, and then $288/mo thereafter.      Eliquis:  $494/mo. Upon receipt of RX [facility] can provide copay savings card to reduce this to $10 per month.  The card covers a maximum of $3,800 per year.       Enoxaparin 150mg x 10 syringes: $143.     Jantoven (warfarin): $5/mo.        --REESE Bales, Pharmacy Technician/Liaison, [facility] [phone]

## 2021-04-22 NOTE — PLAN OF CARE
Pt A/O, afebrile. Needed more oxygen support overnight, now on Bipap/cpap 40% fio2. Heparin adjusted to 17 ml/hr, next lab draw is 1215. Up ad mai. 1 large loose stool overnight. Potassium protocol. Denies nausea, infrequent cough. Refused cough medicine.

## 2021-04-22 NOTE — PROGRESS NOTES
[facility]    Hospitalist Progress Note  Provider : Naseem Jarvis MD  Date of Service (when I saw the patient): 04/22/2021    Assessment & Plan   Cooper Adair is a 40 year old male with a past medical history of JAREN and hypertension who presents with shortness of breath.     Acute hypoxemic respiratory failure and sepsis secondary to Covid-19: Traveled to [place] about 2 weeks ago.  Had symptoms of generalized malaise that started 10 days PTA.  Patient tested positive for Covid-19 on 04/14.  Endorses shortness of breath that is been progressive.  Cough, generalized malaise.  He checked his oxygen saturation at home and was 84%.  Presented to the ED for evaluation.  -ED, patient afebrile, tachycardic and tachypneic. Requiring 2 L on nasal cannula.  BMP showed only mild hypokalemia.  Troponin negative.  LFTs within normal limits.  VBG unremarkable.  CBC showed mild thrombocytopenia and leukopenia.  Chest x-ray showed bilateral opacities.  Patient given dexamethasone.  -Dexamethasone and remdesivir started on admission.  -albuterol prn, tessalon and guaifenesin prn.    -Continuous pulse oximetry.  Monitor inflammatory markers.  I have added on CRP and d dimer.  -On BiPAP at night. Now on oxygen 5 lpm. Will monitor oxygen saturation.   -Maintain precautions    Bilateral pulmonary embolism  -Will continue heparin drip as ordered. Echocardiogram showed normal EF at 60-65% without right heart strain.   -Likely switch to DOAC soon     JAREN: Continue home CPAP  HTN: Continue home losartan     DVT Prophylaxis: Enoxaparin (Lovenox) SQ  Code Status: Full Code   His wife updated.     Disposition: Expected discharge: anticipate more than 2 nights of hospital course.     Naseem Jarvis MD    Interval History   Patient seen and examined. He stated that he is feeling better. Shortness of breath improving. He has no fever. He has no nausea or vomiting.     -Data reviewed today: I reviewed  all new labs and imaging results over the last 24 hours. I personally reviewed  Physical Exam   Temp: 96.8  F (36  C) Temp src: Temporal BP: (!) 156/93 Pulse: 78   Resp: 18 SpO2: 93 % O2 Device: Nasal cannula Oxygen Delivery: 5 LPM  Vitals:    04/21/21 1149 04/21/21 1500 04/21/21 1854   Weight: (!) 151 kg (333 lb) (!) 151 kg (333 lb) (!) 151.6 kg (334 lb 3.2 oz)     Vital Signs with Ranges  Temp:  [96.8  F (36  C)-97.9  F (36.6  C)] 96.8  F (36  C)  Pulse:  [75-89] 78  Resp:  [18-26] 18  BP: (146-156)/() 156/93  FiO2 (%):  [40 %-50 %] 40 %  SpO2:  [88 %-100 %] 93 %  I/O last 3 completed shifts:  In: 240 [P.O.:240]  Out: -     GEN:  Alert, oriented x 3, appears comfortable, NAD.  HEENT:  Normocephalic/atraumatic, no scleral icterus, no nasal discharge, mouth moist.  CV:  Regular rate and rhythm, no murmur or JVD.  S1 + S2 noted, no S3 or S4.  LUNGS:  Clear to auscultation bilaterally without rales/rhonchi/wheezing/retractions.  Symmetric chest rise on inhalation noted.  ABD:  Active bowel sounds, soft, non-tender/non-distended.  No rebound/guarding/rigidity.  EXT:  No edema or cyanosis.  Hands/feet warm to touch with good signs of peripheral perfusion.  No joint synovitis noted.  SKIN:  Dry to touch, no exanthems noted in the visualized areas.  NEURO:  Symmetric muscle strength, sensation to touch grossly intact.  No new focal deficits appreciated.    Medications     heparin 1,700 Units/hr (04/22/21 1047)       remdesivir  100 mg Intravenous Q24H    And     sodium chloride 0.9%  50 mL Intravenous Q24H     dexamethasone  6 mg Intravenous Q24H     sodium chloride (PF)  3 mL Intracatheter Q8H       Data   Recent Labs   Lab 04/22/21  0418 04/21/21  1918 04/21/21  1853 04/21/21  1240   WBC 3.2*  --  3.2* 3.5*   HGB 15.6  --  15.6 15.3   MCV 90  --  89 90   *  --  146* 143*     --   --  138   POTASSIUM 4.1 3.6  --  3.2*   CHLORIDE 106  --   --  102   CO2 28  --   --  30   BUN 13  --   --  10   CR 0.73   --   --  0.78   ANIONGAP 4  --   --  6   CIRILO 8.8  --   --  8.6   *  --   --  111*   ALBUMIN  --   --   --  3.2*   PROTTOTAL  --   --   --  7.7   BILITOTAL  --   --   --  0.5   ALKPHOS  --   --   --  63   ALT  --   --   --  52   AST  --   --   --  45   TROPI  --   --   --  <0.015       Recent Results (from the past 24 hour(s))   CT Chest Pulmonary Embolism w Contrast    Narrative    EXAM: CT CHEST PULMONARY EMBOLISM W CONTRAST  LOCATION: [facility]  DATE/TIME: 4/21/2021 5:29 PM    INDICATION: COVID-19 positive. Recent long travel with hypoxia and shortness of breath.  COMPARISON: None.  TECHNIQUE: CT chest pulmonary angiogram during arterial phase injection of IV contrast. Multiplanar reformats and MIP reconstructions were performed. Dose reduction techniques were used.   CONTRAST:  90mL Isovue-370    FINDINGS:  ANGIOGRAM CHEST: There is nonocclusive embolus both pulmonary artery bifurcations with linear nonocclusive extension into the distal right pulmonary artery. Extension into multiple segmental and subsegmental pulmonary arterial branches throughout both   lungs. Borderline enlargement of the central pulmonary arteries. No right ventricular dilatation.    LUNGS AND PLEURA: Multiple peripheral regions of geographic and nodular groundglass infiltrates in the lungs. There are regions of intralobular thickening, and findings are consistent with COVID-19 pneumonia. Small left pleural effusion.    MEDIASTINUM/AXILLAE: Mild mediastinal and hilar lymphadenopathy which is likely reactive.    CORONARY ARTERY CALCIFICATION: None.    UPPER ABDOMEN: Normal.    MUSCULOSKELETAL: Normal.      Impression    IMPRESSION:  1.  Moderate volume acute bilateral pulmonary embolism involving both pulmonary artery bifurcations and extending into multiple segmental and subsegmental pulmonary arterial branches throughout the lungs. There is borderline enlargement of the central   pulmonary arteries without right  ventricular dilatation.    2.  Pulmonary infiltrates typical COVID-19 pneumonia. Mediastinal and hilar lymphadenopathy likely reactive. Small left pleural effusion.    Results given to Dr Varghese

## 2021-04-23 LAB
ALT SERPL W P-5'-P-CCNC: 117 U/L (ref 0–70)
AST SERPL W P-5'-P-CCNC: 71 U/L (ref 0–45)
CRP SERPL-MCNC: 14.8 MG/L (ref 0–8)
POTASSIUM SERPL-SCNC: 3.4 MMOL/L (ref 3.4–5.3)
UFH PPP CHRO-ACNC: 0.55 IU/ML

## 2021-04-23 PROCEDURE — 999N000105 HC STATISTIC NO DOCUMENTATION TO SUPPORT CHARGE

## 2021-04-23 PROCEDURE — 99233 SBSQ HOSP IP/OBS HIGH 50: CPT | Performed by: INTERNAL MEDICINE

## 2021-04-23 PROCEDURE — 84132 ASSAY OF SERUM POTASSIUM: CPT | Performed by: HOSPITALIST

## 2021-04-23 PROCEDURE — 85520 HEPARIN ASSAY: CPT | Performed by: HOSPITALIST

## 2021-04-23 PROCEDURE — 250N000011 HC RX IP 250 OP 636: Performed by: HOSPITALIST

## 2021-04-23 PROCEDURE — 36415 COLL VENOUS BLD VENIPUNCTURE: CPT | Performed by: HOSPITALIST

## 2021-04-23 PROCEDURE — 94660 CPAP INITIATION&MGMT: CPT

## 2021-04-23 PROCEDURE — 84460 ALANINE AMINO (ALT) (SGPT): CPT | Performed by: HOSPITALIST

## 2021-04-23 PROCEDURE — 120N000001 HC R&B MED SURG/OB

## 2021-04-23 PROCEDURE — 250N000009 HC RX 250: Performed by: HOSPITALIST

## 2021-04-23 PROCEDURE — 84450 TRANSFERASE (AST) (SGOT): CPT | Performed by: HOSPITALIST

## 2021-04-23 PROCEDURE — 258N000003 HC RX IP 258 OP 636: Performed by: HOSPITALIST

## 2021-04-23 PROCEDURE — 86140 C-REACTIVE PROTEIN: CPT | Performed by: HOSPITALIST

## 2021-04-23 PROCEDURE — 999N000157 HC STATISTIC RCP TIME EA 10 MIN

## 2021-04-23 RX ADMIN — SODIUM CHLORIDE 50 ML: 9 INJECTION, SOLUTION INTRAVENOUS at 17:32

## 2021-04-23 RX ADMIN — HEPARIN SODIUM 1700 UNITS/HR: 10000 INJECTION, SOLUTION INTRAVENOUS at 18:50

## 2021-04-23 RX ADMIN — REMDESIVIR 100 MG: 100 INJECTION, POWDER, LYOPHILIZED, FOR SOLUTION INTRAVENOUS at 17:28

## 2021-04-23 RX ADMIN — DEXAMETHASONE SODIUM PHOSPHATE 6 MG: 4 INJECTION, SOLUTION INTRAMUSCULAR; INTRAVENOUS at 07:51

## 2021-04-23 RX ADMIN — HEPARIN SODIUM 1700 UNITS/HR: 10000 INJECTION, SOLUTION INTRAVENOUS at 03:00

## 2021-04-23 ASSESSMENT — ACTIVITIES OF DAILY LIVING (ADL)
ADLS_ACUITY_SCORE: 15
CONCENTRATING,_REMEMBERING_OR_MAKING_DECISIONS_DIFFICULTY: NO
DIFFICULTY_EATING/SWALLOWING: NO
ADLS_ACUITY_SCORE: 15
DIFFICULTY_COMMUNICATING: NO
ADLS_ACUITY_SCORE: 15
TOILETING_ISSUES: NO
ADLS_ACUITY_SCORE: 15
FALL_HISTORY_WITHIN_LAST_SIX_MONTHS: NO
DRESSING/BATHING_DIFFICULTY: NO
ADLS_ACUITY_SCORE: 15
DOING_ERRANDS_INDEPENDENTLY_DIFFICULTY: NO
WEAR_GLASSES_OR_BLIND: YES
VISION_MANAGEMENT: GLASSES
WALKING_OR_CLIMBING_STAIRS_DIFFICULTY: NO
HEARING_DIFFICULTY_OR_DEAF: NO
ADLS_ACUITY_SCORE: 15

## 2021-04-23 NOTE — PLAN OF CARE
Pt up in room independently. IV remdesivir, IV decadron. Heparin drip. Reg diet. Voiding. BM today. Denies pain. 96% of 5L NC. Continue with current therapies and check heparin lab draw in morning.

## 2021-04-23 NOTE — PLAN OF CARE
Pt is A/Ox4, VS monitored- pt wore CPAP overnight with 40 FIO2. Heparin gtt infusing @ 1700ml/hr re-check this AM. Decadron/ Remdesivir. Tolerating regular diet. Up independently. Pt slept well. Plan @ discharge is home.

## 2021-04-23 NOTE — PLAN OF CARE
Afjoon, vss, sats in the mid 90's on 5L NC after being switched over from cpap at night. Up in the room with help with his IV and O2 lines. Less dyspneic when up and around today. Hep levels in therapeutic range so drip left at 1700 units/h.  spoke to pt and spouse. Progressing well.

## 2021-04-23 NOTE — PROGRESS NOTES
Still on 1,700 units per hour heparin drip recheck, tomorrow AM. O2 sats around 95% with 5L NC during the day, C-pap at bedtime. Pt independent in his room, lungs diminished, intermittent dry cough, good appetite. Will keep monitoring.

## 2021-04-23 NOTE — PROGRESS NOTES
[facility]    Hospitalist Progress Note  Provider : Naseem Jarvis MD  Date of Service (when I saw the patient): 04/23/2021    Assessment & Plan   Cooper Adair is a 40 year old male with a past medical history of JAREN and hypertension who presents with shortness of breath.     Acute hypoxemic respiratory failure and sepsis secondary to Covid-19: Traveled to [place] about 2 weeks ago.  Had symptoms of generalized malaise that started 10 days PTA.  Patient tested positive for Covid-19 on 04/14.  Endorses shortness of breath that is been progressive.  Cough, generalized malaise.  He checked his oxygen saturation at home and was 84%.  Presented to the ED for evaluation.  -ED, patient afebrile, tachycardic and tachypneic. Requiring 2 L on nasal cannula.  BMP showed only mild hypokalemia.  Troponin negative.  LFTs within normal limits.  VBG unremarkable.  CBC showed mild thrombocytopenia and leukopenia.  Chest x-ray showed bilateral opacities.  Patient given dexamethasone.  -We will continue dexamethasone for 10 days, day #3, and remdesivir for 5 days total  -albuterol prn, tessalon and guaifenesin prn.    -Continuous pulse oximetry.  Monitor inflammatory markers.  I have added on CRP and d dimer.  -On BiPAP at night. Now on oxygen 5 lpm. Will monitor oxygen saturation.   -Maintain precautions    Bilateral pulmonary embolism  -Will continue heparin drip as ordered. Echocardiogram showed normal EF at 60-65% without right heart strain.   -Likely switch to DOAC soon     JAREN: Continue home CPAP  HTN: Continue home losartan     DVT Prophylaxis: Enoxaparin (Lovenox) SQ  Code Status: Full Code     Disposition: Expected discharge: anticipate more than 2 nights of hospital course.  Updated his wife    Naseem Jarvis MD    Interval History   Patient seen and examined.  He stated that his breathing is much better.  Denies significant cough.  He has no fever.  No nausea or vomiting.    -Data  reviewed today: I reviewed all new labs and imaging results over the last 24 hours. I personally reviewed  Physical Exam   Temp: 97.6  F (36.4  C) Temp src: Temporal BP: (!) 158/92 Pulse: 69   Resp: 22 SpO2: 97 % O2 Device: Nasal cannula Oxygen Delivery: 5 LPM  Vitals:    04/21/21 1149 04/21/21 1500 04/21/21 1854   Weight: (!) 151 kg (333 lb) (!) 151 kg (333 lb) (!) 151.6 kg (334 lb 3.2 oz)     Vital Signs with Ranges  Temp:  [97.1  F (36.2  C)-97.8  F (36.6  C)] 97.6  F (36.4  C)  Pulse:  [65-77] 69  Resp:  [18-22] 22  BP: (137-158)/(84-92) 158/92  FiO2 (%):  [40 %] 40 %  SpO2:  [95 %-100 %] 97 %  I/O last 3 completed shifts:  In: 240 [P.O.:240]  Out: -     GEN:  Alert, oriented x 3, appears comfortable, NAD.  HEENT:  Normocephalic/atraumatic, no scleral icterus, no nasal discharge, mouth moist.  CV:  Regular rate and rhythm, no murmur or JVD.  S1 + S2 noted, no S3 or S4.  LUNGS:  Clear to auscultation bilaterally without rales/rhonchi/wheezing/retractions.  Symmetric chest rise on inhalation noted.  ABD:  Active bowel sounds, soft, non-tender/non-distended.  No rebound/guarding/rigidity.  EXT:  No edema or cyanosis.  Hands/feet warm to touch with good signs of peripheral perfusion.  No joint synovitis noted.  SKIN:  Dry to touch, no exanthems noted in the visualized areas.  NEURO:  Symmetric muscle strength, sensation to touch grossly intact.  No new focal deficits appreciated.    Medications     heparin 1,700 Units/hr (04/23/21 0757)       remdesivir  100 mg Intravenous Q24H    And     sodium chloride 0.9%  50 mL Intravenous Q24H     dexamethasone  6 mg Intravenous Q24H     sodium chloride (PF)  3 mL Intracatheter Q8H       Data   Recent Labs   Lab 04/23/21  0802 04/22/21  0418 04/21/21  1918 04/21/21  1853 04/21/21  1240   WBC  --  3.2*  --  3.2* 3.5*   HGB  --  15.6  --  15.6 15.3   MCV  --  90  --  89 90   PLT  --  129*  --  146* 143*   NA  --  138  --   --  138   POTASSIUM 3.4 4.1 3.6  --  3.2*   CHLORIDE  --   106  --   --  102   CO2  --  28  --   --  30   BUN  --  13  --   --  10   CR  --  0.73  --   --  0.78   ANIONGAP  --  4  --   --  6   CIRILO  --  8.8  --   --  8.6   GLC  --  116*  --   --  111*   ALBUMIN  --   --   --   --  3.2*   PROTTOTAL  --   --   --   --  7.7   BILITOTAL  --   --   --   --  0.5   ALKPHOS  --   --   --   --  63   *  --   --   --  52   AST 71*  --   --   --  45   TROPI  --   --   --   --  <0.015       No results found for this or any previous visit (from the past 24 hour(s)).

## 2021-04-23 NOTE — PROGRESS NOTES
Pt placed on V60 with CPAP +14 (per patients home settings) and 40% FIO2 via the full face mask for JAREN. Skin integrity on bridge of nose is intact with no signs of breakdown. Pt is tolerating well. Respiratory will continue to follow.

## 2021-04-24 LAB
ALBUMIN SERPL-MCNC: 2.8 G/DL (ref 3.4–5)
ALP SERPL-CCNC: 53 U/L (ref 40–150)
ALT SERPL W P-5'-P-CCNC: 106 U/L (ref 0–70)
ANION GAP SERPL CALCULATED.3IONS-SCNC: 3 MMOL/L (ref 3–14)
AST SERPL W P-5'-P-CCNC: 56 U/L (ref 0–45)
BILIRUB DIRECT SERPL-MCNC: 0.1 MG/DL (ref 0–0.2)
BILIRUB SERPL-MCNC: 0.5 MG/DL (ref 0.2–1.3)
BUN SERPL-MCNC: 13 MG/DL (ref 7–30)
CALCIUM SERPL-MCNC: 8.2 MG/DL (ref 8.5–10.1)
CHLORIDE SERPL-SCNC: 108 MMOL/L (ref 94–109)
CO2 SERPL-SCNC: 28 MMOL/L (ref 20–32)
CREAT SERPL-MCNC: 0.71 MG/DL (ref 0.66–1.25)
CRP SERPL-MCNC: 9.2 MG/L (ref 0–8)
ERYTHROCYTE [DISTWIDTH] IN BLOOD BY AUTOMATED COUNT: 12.9 % (ref 10–15)
GFR SERPL CREATININE-BSD FRML MDRD: >90 ML/MIN/{1.73_M2}
GLUCOSE SERPL-MCNC: 113 MG/DL (ref 70–99)
HBA1C MFR BLD: 6 % (ref 0–5.6)
HCT VFR BLD AUTO: 41.3 % (ref 40–53)
HGB BLD-MCNC: 13.4 G/DL (ref 13.3–17.7)
MCH RBC QN AUTO: 28.9 PG (ref 26.5–33)
MCHC RBC AUTO-ENTMCNC: 32.4 G/DL (ref 31.5–36.5)
MCV RBC AUTO: 89 FL (ref 78–100)
PLATELET # BLD AUTO: 209 10E9/L (ref 150–450)
POTASSIUM SERPL-SCNC: 3.6 MMOL/L (ref 3.4–5.3)
PROT SERPL-MCNC: 6.5 G/DL (ref 6.8–8.8)
RBC # BLD AUTO: 4.64 10E12/L (ref 4.4–5.9)
SODIUM SERPL-SCNC: 139 MMOL/L (ref 133–144)
UFH PPP CHRO-ACNC: 0.44 IU/ML
WBC # BLD AUTO: 6.5 10E9/L (ref 4–11)

## 2021-04-24 PROCEDURE — 86140 C-REACTIVE PROTEIN: CPT | Performed by: HOSPITALIST

## 2021-04-24 PROCEDURE — 36415 COLL VENOUS BLD VENIPUNCTURE: CPT | Performed by: HOSPITALIST

## 2021-04-24 PROCEDURE — 120N000001 HC R&B MED SURG/OB

## 2021-04-24 PROCEDURE — 250N000013 HC RX MED GY IP 250 OP 250 PS 637: Performed by: INTERNAL MEDICINE

## 2021-04-24 PROCEDURE — 85027 COMPLETE CBC AUTOMATED: CPT | Performed by: HOSPITALIST

## 2021-04-24 PROCEDURE — 99233 SBSQ HOSP IP/OBS HIGH 50: CPT | Mod: GC | Performed by: INTERNAL MEDICINE

## 2021-04-24 PROCEDURE — 80076 HEPATIC FUNCTION PANEL: CPT | Performed by: HOSPITALIST

## 2021-04-24 PROCEDURE — 250N000011 HC RX IP 250 OP 636: Performed by: HOSPITALIST

## 2021-04-24 PROCEDURE — 85520 HEPARIN ASSAY: CPT | Performed by: HOSPITALIST

## 2021-04-24 PROCEDURE — 258N000003 HC RX IP 258 OP 636: Performed by: HOSPITALIST

## 2021-04-24 PROCEDURE — 250N000009 HC RX 250: Performed by: HOSPITALIST

## 2021-04-24 PROCEDURE — 94660 CPAP INITIATION&MGMT: CPT

## 2021-04-24 PROCEDURE — 999N000157 HC STATISTIC RCP TIME EA 10 MIN

## 2021-04-24 PROCEDURE — 80048 BASIC METABOLIC PNL TOTAL CA: CPT | Performed by: HOSPITALIST

## 2021-04-24 PROCEDURE — 83036 HEMOGLOBIN GLYCOSYLATED A1C: CPT | Performed by: HOSPITALIST

## 2021-04-24 RX ORDER — LOSARTAN POTASSIUM 50 MG/1
50 TABLET ORAL DAILY
Status: DISCONTINUED | OUTPATIENT
Start: 2021-04-24 | End: 2021-04-26 | Stop reason: HOSPADM

## 2021-04-24 RX ADMIN — LOSARTAN POTASSIUM 50 MG: 50 TABLET, FILM COATED ORAL at 08:52

## 2021-04-24 RX ADMIN — REMDESIVIR 100 MG: 100 INJECTION, POWDER, LYOPHILIZED, FOR SOLUTION INTRAVENOUS at 17:13

## 2021-04-24 RX ADMIN — SODIUM CHLORIDE 50 ML: 9 INJECTION, SOLUTION INTRAVENOUS at 17:17

## 2021-04-24 RX ADMIN — HEPARIN SODIUM 1700 UNITS/HR: 10000 INJECTION, SOLUTION INTRAVENOUS at 10:19

## 2021-04-24 RX ADMIN — DEXAMETHASONE SODIUM PHOSPHATE 6 MG: 4 INJECTION, SOLUTION INTRAMUSCULAR; INTRAVENOUS at 08:52

## 2021-04-24 ASSESSMENT — ACTIVITIES OF DAILY LIVING (ADL)
ADLS_ACUITY_SCORE: 15

## 2021-04-24 NOTE — PLAN OF CARE
DX: Covid + Bilat PE's  Tele: na  A&O x4  Activity: independent  Diet: reg  VSS: Q8  O2: 95% nc 5L FAROOQ  BG: na  PIV: Hep gtt 17ml/hr LA  Pain: denies  GI/: continent  Labs: Hep10a recheck in am, K 3.4  Plan: continue POC  Discharge: Couple days possibly home, will continue to monitor.  CPAP overnight  [id number]

## 2021-04-24 NOTE — PROGRESS NOTES
[facility]  Hospitalist Progress Note  Mercy Sparks MD 04/24/21  Text Page  Pager: [phone] (7am-6pm)    Reason for Stay (Diagnosis): COVID-19 viral PNA         Assessment and Plan:      Summary of Stay: Cooper Adair is a 40 year old male with past medical history of JAREN on CPAP, hypertension and obesity who was admitted on 4/21/2021 with progressive shortness of breath and malaise in the setting of known COVID-19 infection (diagnosed on 4/14) and was found to have acute hypoxic respiratory failure due to COVID-19 viral pneumonia in addition to acute bilateral PEs.  Patient is slowly improving but still requiring significant hypoxia.    Problem List/Assessment and Plan:     Acute hypoxemic respiratory failure and sepsis secondary to COVID-19: Traveled to [place] about 2 weeks PTA.  Had symptoms of generalized malaise that started 10 days PTA.  Patient tested positive for COVID-19 on 04/14.  Symptoms progressed and ultimately admitted when he was found to have hypoxia.  Labs show mild thrombocytopenia and leukopenia, CT does show bilateral infiltrates consistent with COVID.  He is currently requiring 5 L via nasal cannula.  Overall does feel better than admission.  Now has mild transaminitis, likely due to COVID.  Repeat tomorrow to ensure this is not worsening.  -Continue supplemental oxygen, wean as able  -Continue remdesivir, today day 4/5  -Continue Decadron 6 mg daily, today day 4/10     Acute bilateral pulmonary embolism: CT showed moderate volume acute bilateral PE involving both pulmonary artery bifurcations and extending into multiple segmental and subsegmental pulmonary arterial branches throughout the lungs.  TTE showed normal EF without significant right heart strain.  Troponin was not elevated.  He was started on heparin drip.  -Continue heparin drip for now  -Pharmacy liaison consult to determine coverage for NOACs     JAREN: Continue home CPAP    HTN: Continue home  "losartan    Obesity: Complicates cares.    Hypokalemia: Replace per protocol.    Hyperglycemia: Mild but will check hemoglobin A1c to ensure no underlying diabetes.    Addendum: HgbA1c of 6, pre diabetes.  Needs to follow up with PCP.    Diet: Combination Diet Regular Diet Adult    DVT Prophylaxis: Heparin drip  Landry Catheter: not present  Code Status: Full Code      Disposition Plan   Expected discharge: 2 - 3 days, recommended to prior living arrangement once improved oxygenation.  Entered: Mercy Sparks MD 04/24/2021, 9:34 AM       The patient's care was discussed with the Bedside Nurse, Patient and Patient's Family.    Hospitalist Service  [facility]          Interval History (Subjective):      Patient was seen with his bedside nurse this morning. States overall he feels much better.  SOB improving.  Denies CP, nausea, emesis, abdominal pain.  Still coughing but it is non productive.  Discussed his care plan.  He also called his wife and I spoke with her by telephone, all questions answered.                  Physical Exam:      Last Vital Signs:  BP (!) 141/84 (BP Location: Right arm)   Pulse 67   Temp 96.7  F (35.9  C) (Temporal)   Resp 16   Ht 1.93 m (6' 4\")   Wt (!) 151.6 kg (334 lb 3.2 oz)   SpO2 98%   BMI 40.68 kg/m      General: Alert, awake, no acute distress.  HEENT: Normocephalic and atraumatic, eyes anicteric and without scleral injection, EOMI, face symmetric, MMM.  Cardiac: RRR, normal S1, S2. No m/g/r, no LE edema.  Pulmonary: Normal chest rise, normal work of breathing.  Lungs CTAB without crackles or wheezing.  Abdomen: soft, non-tender, non-distended.  Normoactive bowel sounds, no guarding or rebound tenderness.  Extremities: no deformities.  Warm, well perfused.  Skin: no rashes or lesions.  Warm and Dry.  Neuro: No focal deficits.  Speech clear.  Coordination and strength grossly normal.  Psych: Alert and oriented x3. Appropriate affect.         Medications:    "   All current medications were reviewed with changes reflected in problem list.         Data:      All new lab and imaging data was reviewed.   Labs:  Recent Labs   Lab 04/24/21  0657 04/22/21  0418 04/21/21  1853   WBC 6.5 3.2* 3.2*   HGB 13.4 15.6 15.6   HCT 41.3 48.3 47.8   MCV 89 90 89    129* 146*     Recent Labs   Lab 04/24/21  0657 04/23/21  0802 04/22/21  0418 04/21/21  1240 04/21/21  1240     --  138  --  138   POTASSIUM 3.6 3.4 4.1   < > 3.2*   CHLORIDE 108  --  106  --  102   CO2 28  --  28  --  30   ANIONGAP 3  --  4  --  6   *  --  116*  --  111*   BUN 13  --  13  --  10   CR 0.71  --  0.73  --  0.78   GFRESTIMATED >90  --  >90  --  >90   GFRESTBLACK >90  --  >90  --  >90   CIRILO 8.2*  --  8.8  --  8.6   PROTTOTAL 6.5*  --   --   --  7.7   ALBUMIN 2.8*  --   --   --  3.2*   BILITOTAL 0.5  --   --   --  0.5   ALKPHOS 53  --   --   --  63   AST 56* 71*  --   --  45   * 117*  --   --  52    < > = values in this interval not displayed.     Recent Labs   Lab 04/21/21  1240   DD 4.8*     Recent Labs   Lab 04/24/21  0657 04/23/21  0802 04/22/21  0418   CRP 9.2* 14.8* 31.9*     Recent Labs   Lab 04/21/21  1240   TROPI <0.015      Imaging:   No results found for this or any previous visit (from the past 24 hour(s)).    Mercy Sparks MD

## 2021-04-24 NOTE — PLAN OF CARE
VSS: on CPAP with 40% overnight. Heparin gtt infusing@1700ml/hr. Denies pain. Sleeping comfortably throughout the night.  Up independently in the room.

## 2021-04-24 NOTE — PLAN OF CARE
VS-afebrile, cozaar started today. Weaning oxygen.   Lung Sounds-clear, diminished bases, using the IS upto 2000. Denies feeling SOB or FAROOQ.   O2-started on bipap, then when awake started on 5L NC, weaned to 4L cont pulse ox on. 93-95% on 4 L NC  GI-+Bs, +flatus, lbm was this shift. Tolerating reg diet, denies nausea.   -voiding with out difficulty,   IVF-heparin at 1700 units/hour.   Dressings-none.   CMS-denies numbness and tingling, +pp, no swelling noted.  Drain-none.   Activity-up independently.   Pain-none. No pain meds given.   D/C Plan-home when able, lives with wife and kids.

## 2021-04-24 NOTE — PROGRESS NOTES
A CPAP +14 @ 40% was applied to the pt via the mask for an increase in WOB and/or SOB. Skin integrity is good. Pt is tolerating it well. Will continue to monitor and assess the pt's current respiratory status and needs.    Vicente Yadav RT on 4/24/2021 at 4:36 AM

## 2021-04-25 LAB
ALBUMIN SERPL-MCNC: 2.9 G/DL (ref 3.4–5)
ALP SERPL-CCNC: 55 U/L (ref 40–150)
ALT SERPL W P-5'-P-CCNC: 163 U/L (ref 0–70)
ANION GAP SERPL CALCULATED.3IONS-SCNC: 2 MMOL/L (ref 3–14)
AST SERPL W P-5'-P-CCNC: 91 U/L (ref 0–45)
BILIRUB SERPL-MCNC: 0.5 MG/DL (ref 0.2–1.3)
BUN SERPL-MCNC: 12 MG/DL (ref 7–30)
CALCIUM SERPL-MCNC: 8.5 MG/DL (ref 8.5–10.1)
CHLORIDE SERPL-SCNC: 109 MMOL/L (ref 94–109)
CO2 SERPL-SCNC: 29 MMOL/L (ref 20–32)
CREAT SERPL-MCNC: 0.84 MG/DL (ref 0.66–1.25)
CRP SERPL-MCNC: 6.1 MG/L (ref 0–8)
ERYTHROCYTE [DISTWIDTH] IN BLOOD BY AUTOMATED COUNT: 13.1 % (ref 10–15)
GFR SERPL CREATININE-BSD FRML MDRD: >90 ML/MIN/{1.73_M2}
GLUCOSE SERPL-MCNC: 104 MG/DL (ref 70–99)
HCT VFR BLD AUTO: 40.6 % (ref 40–53)
HGB BLD-MCNC: 12.8 G/DL (ref 13.3–17.7)
MCH RBC QN AUTO: 28.6 PG (ref 26.5–33)
MCHC RBC AUTO-ENTMCNC: 31.5 G/DL (ref 31.5–36.5)
MCV RBC AUTO: 91 FL (ref 78–100)
PLATELET # BLD AUTO: 224 10E9/L (ref 150–450)
POTASSIUM SERPL-SCNC: 3.8 MMOL/L (ref 3.4–5.3)
PROT SERPL-MCNC: 6.4 G/DL (ref 6.8–8.8)
RBC # BLD AUTO: 4.48 10E12/L (ref 4.4–5.9)
SODIUM SERPL-SCNC: 140 MMOL/L (ref 133–144)
UFH PPP CHRO-ACNC: 0.55 IU/ML
WBC # BLD AUTO: 6.7 10E9/L (ref 4–11)

## 2021-04-25 PROCEDURE — 85520 HEPARIN ASSAY: CPT | Performed by: INTERNAL MEDICINE

## 2021-04-25 PROCEDURE — 999N000105 HC STATISTIC NO DOCUMENTATION TO SUPPORT CHARGE

## 2021-04-25 PROCEDURE — 80053 COMPREHEN METABOLIC PANEL: CPT | Performed by: INTERNAL MEDICINE

## 2021-04-25 PROCEDURE — 250N000013 HC RX MED GY IP 250 OP 250 PS 637: Performed by: INTERNAL MEDICINE

## 2021-04-25 PROCEDURE — 86140 C-REACTIVE PROTEIN: CPT | Performed by: INTERNAL MEDICINE

## 2021-04-25 PROCEDURE — 258N000003 HC RX IP 258 OP 636: Performed by: HOSPITALIST

## 2021-04-25 PROCEDURE — 94660 CPAP INITIATION&MGMT: CPT

## 2021-04-25 PROCEDURE — 250N000011 HC RX IP 250 OP 636: Performed by: HOSPITALIST

## 2021-04-25 PROCEDURE — 84132 ASSAY OF SERUM POTASSIUM: CPT | Performed by: INTERNAL MEDICINE

## 2021-04-25 PROCEDURE — 120N000001 HC R&B MED SURG/OB

## 2021-04-25 PROCEDURE — 250N000012 HC RX MED GY IP 250 OP 636 PS 637: Performed by: INTERNAL MEDICINE

## 2021-04-25 PROCEDURE — 36415 COLL VENOUS BLD VENIPUNCTURE: CPT | Performed by: INTERNAL MEDICINE

## 2021-04-25 PROCEDURE — 85027 COMPLETE CBC AUTOMATED: CPT | Performed by: INTERNAL MEDICINE

## 2021-04-25 PROCEDURE — 99233 SBSQ HOSP IP/OBS HIGH 50: CPT | Mod: GC | Performed by: INTERNAL MEDICINE

## 2021-04-25 PROCEDURE — 999N000157 HC STATISTIC RCP TIME EA 10 MIN

## 2021-04-25 PROCEDURE — 250N000009 HC RX 250: Performed by: HOSPITALIST

## 2021-04-25 RX ORDER — HEPARIN SODIUM 10000 [USP'U]/100ML
0-5000 INJECTION, SOLUTION INTRAVENOUS CONTINUOUS
Status: DISCONTINUED | OUTPATIENT
Start: 2021-04-25 | End: 2021-04-25

## 2021-04-25 RX ADMIN — RIVAROXABAN 15 MG: 15 TABLET, FILM COATED ORAL at 08:41

## 2021-04-25 RX ADMIN — DEXAMETHASONE 6 MG: 2 TABLET ORAL at 08:41

## 2021-04-25 RX ADMIN — LOSARTAN POTASSIUM 50 MG: 50 TABLET, FILM COATED ORAL at 08:42

## 2021-04-25 RX ADMIN — REMDESIVIR 100 MG: 100 INJECTION, POWDER, LYOPHILIZED, FOR SOLUTION INTRAVENOUS at 17:11

## 2021-04-25 RX ADMIN — SODIUM CHLORIDE 50 ML: 9 INJECTION, SOLUTION INTRAVENOUS at 17:12

## 2021-04-25 RX ADMIN — HEPARIN SODIUM 1700 UNITS/HR: 10000 INJECTION, SOLUTION INTRAVENOUS at 01:24

## 2021-04-25 RX ADMIN — RIVAROXABAN 15 MG: 15 TABLET, FILM COATED ORAL at 17:13

## 2021-04-25 ASSESSMENT — ACTIVITIES OF DAILY LIVING (ADL)
ADLS_ACUITY_SCORE: 15

## 2021-04-25 ASSESSMENT — MIFFLIN-ST. JEOR: SCORE: 2518.78

## 2021-04-25 NOTE — PROGRESS NOTES
[facility]  Hospitalist Progress Note  Mercy Sparks MD 04/25/21   Text Page  Pager: [phone] (7am-6pm)    Reason for Stay (Diagnosis): COVID-19 viral PNA         Assessment and Plan:      Summary of Stay: Cooper Adair is a 40 year old male with past medical history of JAREN on CPAP, hypertension and obesity who was admitted on 4/21/2021 with progressive shortness of breath and malaise in the setting of known COVID-19 infection (diagnosed on 4/14) and was found to have acute hypoxic respiratory failure due to COVID-19 viral pneumonia in addition to acute bilateral PEs.  Slowly improving, continues to require oxygen.    Problem List/Assessment and Plan:     Acute hypoxemic respiratory failure and sepsis secondary to COVID-19: Traveled to [place] about 2 weeks PTA.  Had symptoms of generalized malaise that started 10 days PTA.  Patient tested positive for COVID-19 on 04/14.  Symptoms progressed and ultimately admitted when he was found to have hypoxia.  Labs show mild thrombocytopenia and leukopenia, CT does show bilateral infiltrates consistent with COVID.  He is currently requiring 5 L via nasal cannula.  Overall does feel better than admission.  Now has mild transaminitis, likely due to COVID.  -Continue supplemental oxygen, wean as able  -Continue remdesivir, today day 5/5  -Continue Decadron 6 mg daily, today day 5/10    Transaminitis: Fairly mild and likely due to COVID infection.  No abdominal pain.  Repeat tomorrow.     Acute bilateral pulmonary embolism: CT showed moderate volume acute bilateral PE involving both pulmonary artery bifurcations and extending into multiple segmental and subsegmental pulmonary arterial branches throughout the lungs.  TTE showed normal EF without significant right heart strain.  Troponin was not elevated.  He was started on heparin drip, transitioning to Xarelto today.  -Start Xarelto 15 mg BID x21 days (through 5/15) then to 20 mg every day  -Stop  "heparin drip  -Pharmacy liaison consult to determine coverage for NOACs     JAREN: Has been using hospital BiPAP due to need for oxygen.  RT is working on obtaining an accessory for the patient's home CPAP machine so that he can use this tonight.    HTN: Continue home losartan    Obesity: Complicates cares.    Hypokalemia: Replace per protocol.    Hyperglycemia, prediabetes: Hemoglobin A1c is 6.  I discussed with the patient as well as his wife by phone that this is consistent with prediabetes.  He will need to follow-up with his primary care doctor in the outpatient setting.    Diet: Combination Diet Regular Diet Adult    DVT Prophylaxis: Heparin drip  Landry Catheter: not present  Code Status: Full Code      Disposition Plan   Expected discharge: 2 days, recommended to prior living arrangement once improved oxygenation.  Entered: Mercy Sparks MD 04/25/2021, 10:34 AM       The patient's care was discussed with the Bedside Nurse, Patient and Patient's Family.    Hospitalist [facility]          Interval History (Subjective):      Patient was discussed with his bedside nurse this morning.  When I saw the patient he also had his wife on speaker phone.  He states that he is feeling slightly better today compared to yesterday.  No chest pain.  Continues to have a dry cough.  Shortness of breath is overall improved.  No nausea or vomiting.  He is eating and drinking well.  Discussed transitioning off heparin and to Xarelto today.  I also discussed the diagnosis of prediabetes.  All of his and his wife's questions were answered.                  Physical Exam:      Last Vital Signs:  /79 (BP Location: Right arm)   Pulse 59   Temp 96  F (35.6  C) (Temporal)   Resp 24   Ht 1.93 m (6' 4\")   Wt (!) 151.7 kg (334 lb 8 oz)   SpO2 97%   BMI 40.72 kg/m      General: Alert, awake, no acute distress.  HEENT: Normocephalic and atraumatic, eyes anicteric and without scleral injection, " EOMI, face symmetric, MMM.  Cardiac: RRR, normal S1, S2. No m/g/r, no LE edema.  Pulmonary: Normal chest rise, normal work of breathing.  Lungs CTAB without crackles or wheezing.  Abdomen: soft, non-tender, non-distended.  Normoactive bowel sounds, no guarding or rebound tenderness.  Extremities: no deformities.  Warm, well perfused.  Skin: no rashes or lesions.  Warm and Dry.  Neuro: No focal deficits.  Speech clear.  Coordination and strength grossly normal.  Psych: Alert and oriented x3. Appropriate affect.         Medications:      All current medications were reviewed with changes reflected in problem list.         Data:      All new lab and imaging data was reviewed.   Labs:  Recent Labs   Lab 04/25/21  0708 04/24/21  0657 04/22/21  0418   WBC 6.7 6.5 3.2*   HGB 12.8* 13.4 15.6   HCT 40.6 41.3 48.3   MCV 91 89 90    209 129*     Recent Labs   Lab 04/25/21  0708 04/24/21  0657 04/23/21  0802 04/22/21  0418 04/21/21  1240 04/21/21  1240    139  --  138  --  138   POTASSIUM 3.8 3.6 3.4 4.1   < > 3.2*   CHLORIDE 109 108  --  106  --  102   CO2 29 28  --  28  --  30   ANIONGAP 2* 3  --  4  --  6   * 113*  --  116*  --  111*   BUN 12 13  --  13  --  10   CR 0.84 0.71  --  0.73  --  0.78   GFRESTIMATED >90 >90  --  >90  --  >90   GFRESTBLACK >90 >90  --  >90  --  >90   CIRILO 8.5 8.2*  --  8.8  --  8.6   PROTTOTAL 6.4* 6.5*  --   --   --  7.7   ALBUMIN 2.9* 2.8*  --   --   --  3.2*   BILITOTAL 0.5 0.5  --   --   --  0.5   ALKPHOS 55 53  --   --   --  63   AST 91* 56* 71*  --   --  45   * 106* 117*  --   --  52    < > = values in this interval not displayed.     Recent Labs   Lab 04/21/21  1240   DD 4.8*     Recent Labs   Lab 04/25/21  0708 04/24/21  0657 04/23/21  0802   CRP 6.1 9.2* 14.8*     Recent Labs   Lab 04/21/21  1240   TROPI <0.015      Imaging:   No results found for this or any previous visit (from the past 24 hour(s)).    Mercy Sparks MD

## 2021-04-25 NOTE — PLAN OF CARE
Continues on special precautions. VSS: on CPAP with 40% FIO2 overnight. Heparin gtt infusing@1700ml/hr. Denies pain. Sleeping comfortably throughout the night.  Up independently in the room. Discharge TBD.

## 2021-04-25 NOTE — PLAN OF CARE
VS-afebrile, stable, weaning oxygen.   Lung Sounds-clear, diminished bases, dry cough, using IS upto 2250  O2 -- started oxygen on 3L NC down to room air, cont pulse ox on.,   GI-+Bs, +flatus, lbm was today, tolerating reg diet, denies nausea.   -voiding well.   IVF-sl iv. Stopped heparin drip this am, started xarelto.   Dressings-none.   CMS-denies numbness and tingling, +pp, no edema noted.   Drain-none.   Activity-up independently. Walking in room.   Pain-denies pain.   D/C Plan-home when able, possible tomorrow.

## 2021-04-25 NOTE — PLAN OF CARE
Pt alert and oriented x4.  Vital signs stable.  Sats on 2 lpm nasal cannula, 95%.  Occasional non productive cough with dyspnea with ambulation.  Lungs diminished in bases, encouraged inspirometer use hourly and ambulation in room.  Bowel sounds hypoactive, voiding, appetite is good.encouraged po fluid intake.  Pt on iv Remdesivir and po decadron. Heparin infusing at 1700 units per hr. Recheck of potassium and heparin 10 A level in am.  Denies pain.  Pt uses bipap at hs here in hospital, RT paged to assess and set up same.  Special isolation precautions.  Care plan reviewed with pt.

## 2021-04-26 VITALS
BODY MASS INDEX: 38.36 KG/M2 | SYSTOLIC BLOOD PRESSURE: 143 MMHG | HEART RATE: 71 BPM | RESPIRATION RATE: 18 BRPM | DIASTOLIC BLOOD PRESSURE: 77 MMHG | OXYGEN SATURATION: 97 % | WEIGHT: 315 LBS | HEIGHT: 76 IN | TEMPERATURE: 98.6 F

## 2021-04-26 LAB
ALBUMIN SERPL-MCNC: 3.1 G/DL (ref 3.4–5)
ALP SERPL-CCNC: 60 U/L (ref 40–150)
ALT SERPL W P-5'-P-CCNC: 208 U/L (ref 0–70)
ANION GAP SERPL CALCULATED.3IONS-SCNC: 3 MMOL/L (ref 3–14)
AST SERPL W P-5'-P-CCNC: 88 U/L (ref 0–45)
BILIRUB SERPL-MCNC: 0.5 MG/DL (ref 0.2–1.3)
BUN SERPL-MCNC: 13 MG/DL (ref 7–30)
CALCIUM SERPL-MCNC: 8.8 MG/DL (ref 8.5–10.1)
CHLORIDE SERPL-SCNC: 107 MMOL/L (ref 94–109)
CO2 SERPL-SCNC: 31 MMOL/L (ref 20–32)
CREAT SERPL-MCNC: 0.79 MG/DL (ref 0.66–1.25)
CRP SERPL-MCNC: 4.6 MG/L (ref 0–8)
GFR SERPL CREATININE-BSD FRML MDRD: >90 ML/MIN/{1.73_M2}
GLUCOSE SERPL-MCNC: 96 MG/DL (ref 70–99)
POTASSIUM SERPL-SCNC: 4 MMOL/L (ref 3.4–5.3)
PROT SERPL-MCNC: 7 G/DL (ref 6.8–8.8)
SODIUM SERPL-SCNC: 141 MMOL/L (ref 133–144)

## 2021-04-26 PROCEDURE — 80053 COMPREHEN METABOLIC PANEL: CPT | Performed by: INTERNAL MEDICINE

## 2021-04-26 PROCEDURE — 99239 HOSP IP/OBS DSCHRG MGMT >30: CPT | Mod: GC | Performed by: INTERNAL MEDICINE

## 2021-04-26 PROCEDURE — 250N000012 HC RX MED GY IP 250 OP 636 PS 637: Performed by: INTERNAL MEDICINE

## 2021-04-26 PROCEDURE — 86140 C-REACTIVE PROTEIN: CPT | Performed by: INTERNAL MEDICINE

## 2021-04-26 PROCEDURE — 36415 COLL VENOUS BLD VENIPUNCTURE: CPT | Performed by: INTERNAL MEDICINE

## 2021-04-26 PROCEDURE — 250N000013 HC RX MED GY IP 250 OP 250 PS 637: Performed by: INTERNAL MEDICINE

## 2021-04-26 RX ORDER — DEXAMETHASONE 6 MG/1
6 TABLET ORAL DAILY
Qty: 4 TABLET | Refills: 0 | Status: SHIPPED | OUTPATIENT
Start: 2021-04-27 | End: 2021-05-01

## 2021-04-26 RX ORDER — APIXABAN 5 MG (74)
KIT ORAL
Qty: 74 EACH | Refills: 0 | Status: SHIPPED | OUTPATIENT
Start: 2021-04-26 | End: 2021-05-26

## 2021-04-26 RX ORDER — FAMOTIDINE 10 MG
10 TABLET ORAL 2 TIMES DAILY
Status: DISCONTINUED | OUTPATIENT
Start: 2021-04-26 | End: 2021-04-26 | Stop reason: HOSPADM

## 2021-04-26 RX ORDER — FAMOTIDINE 10 MG
10 TABLET ORAL 2 TIMES DAILY
Qty: 60 TABLET | Refills: 0 | Status: SHIPPED | OUTPATIENT
Start: 2021-04-26

## 2021-04-26 RX ADMIN — RIVAROXABAN 15 MG: 15 TABLET, FILM COATED ORAL at 08:11

## 2021-04-26 RX ADMIN — DEXAMETHASONE 6 MG: 2 TABLET ORAL at 08:11

## 2021-04-26 RX ADMIN — FAMOTIDINE 10 MG: 10 TABLET ORAL at 08:17

## 2021-04-26 RX ADMIN — LOSARTAN POTASSIUM 50 MG: 50 TABLET, FILM COATED ORAL at 08:11

## 2021-04-26 ASSESSMENT — ACTIVITIES OF DAILY LIVING (ADL)
ADLS_ACUITY_SCORE: 15

## 2021-04-26 ASSESSMENT — MIFFLIN-ST. JEOR: SCORE: 2532.84

## 2021-04-26 NOTE — PLAN OF CARE
Vital signs stable  .Pt independent in room.  Lungs diminished, infrequent non product cough, encouraged inspirometer use.  Appetite is good, no nausea, no headache.  Voiding.  On iv Remdesivir and po decadron.  Special isolation precautions.  Care plan reviewed with pt.

## 2021-04-26 NOTE — PLAN OF CARE
Pt A&Ox4. VSS afebrile RA. LS diminished. +bs/flatus. Tolerated regular diet. A1C 6.0 pt will follow up with patient about new diabetes diagnosis. Pharmacy talked with patient because of his insurance he will take Eliquis at home. Here at hospital he was on Xeralto. Up independently in room. Pt took all belongings including cell phone and  and his own CPAP. Discharge medications given to patient and discharge instructions discussed and given with patient. Plan is for wife to pick him up after she gets home from work at 3pm. POC reviewed with pt and will continue to monitor. Pt wife picked him up at 1515 he left floor with support staff via .

## 2021-04-26 NOTE — PLAN OF CARE
Continues on special precautions. VSS:  CPAP  on RA, sats above 90%. Denies pain. Sleeping comfortably throughout the night. Up independently in the room. Possible discharge to home today.

## 2021-04-26 NOTE — DISCHARGE INSTRUCTIONS
Eliquis Oral Tablet 5 mg  Uses  This medicine is used for the following purposes:    prevent blood clots    blood clot  Instructions  This medicine may be taken with or without food.  It is very important that you take the medicine at about the same time every day. It will work best if you do this.  Store at room temperature in a dry place. Do not keep in the bathroom.  Keep the medicine away from heat and light.  Avoid grapefruit juice while on this medicine.  It is important that you keep taking each dose of this medicine on time even if you are feeling well.  If you forget to take a dose on time, take it as soon as you remember. If it is almost time for the next dose, do not take the missed dose. Return to your normal dosing schedule. Do not take 2 doses of this medicine at one time.  Please tell your doctor and pharmacist about all the medicines you take. Include both prescription and over-the-counter medicines. Also tell them about any vitamins, herbal medicines, or anything else you take for your health.  Do not suddenly stop taking this medicine. Check with your doctor before stopping.  It is very important that you follow your doctor's instructions for all blood tests.  Cautions  This medicine may cause serious bleeding problems in patients taking blood thinner medications. Follow your doctor's instructions carefully to monitor your blood lab tests if you are on blood thinners.  Tell your doctor and pharmacist if you ever had an allergic reaction to a medicine. Symptoms of an allergic reaction can include trouble breathing, skin rash, itching, swelling, or severe dizziness.  Some patients taking this medicine have experienced serious side effects. Please speak with your doctor to understand the risks and benefits associated with this medicine.  This medicine may cause serious bleeding from the stomach or bowels. Stop this medicine and call your doctor immediately if you see any signs of bleeding. Bleeding  can cause pain in the stomach, vomiting up liquid that looks like coffee grounds, and red or dark tarry stools.  There is an increased risk of bleeding while on this medicine, please tell your doctor or nurse if you notice any excessive bleeding or bruising.  Do not use the medication any more than instructed.  Speak with your doctor before taking any medicine with aspirin.  Please check with your doctor before drinking alcohol while on this medicine.  Tell the doctor or pharmacist if you are pregnant, planning to be pregnant, or breastfeeding.  Do not breastfeed while on this medicine.  This medicine can hurt a new baby in the womb. If you become pregnant while on this medicine, tell your doctor immediately. Your doctor may switch you to a different medicine.  Do not take Gayle's wort while on this medicine.  Ask your pharmacist if this medicine can interact with any of your other medicines. Be sure to tell them about all the medicines you take.  Please tell all your doctors and dentists that you are on this medicine before they provide care.  Do not start or stop any other medicines without first speaking to your doctor or pharmacist.  Call your doctor right away if you notice any unusual bleeding or bruising.  Do not share this medicine with anyone who has not been prescribed this medicine.  This medicine can cause serious side effects in some patients. Important information from the [place] Food and Drug Administration (FDA) is available from your pharmacist. Please review it carefully with your pharmacist to understand the risks associated with this medicine.  Always refill this medicine before it runs out.  Side Effects  The following is a list of some common side effects from this medicine. Please speak with your doctor about what you should do if you experience these or other side effects.    increased risk of bleeding    nosebleeds  Call your doctor or get medical help right away if you notice any of these  more serious side effects:    bleeding that is severe or takes longer to stop    unusual bruising or discoloration on skin    coughing up blood or vomit that looks like coffee grounds    fainting    severe or persistent headache    sudden leg pain, swelling, warmth or redness    loss of movement anywhere on the body    feeling of numbness or tingling in your hands and feet    shortness of breath    blood in stool    dark, tarry stool    symptoms of stroke (such as one-sided weakness, slurred speech, confusion)    difficulty swallowing    unusual or unexplained tiredness or weakness    blood in urine    blurring or changes of vision  A few people may have an allergic reactions to this medicine. Symptoms can include difficulty breathing, skin rash, itching, swelling, or severe dizziness. If you notice any of these symptoms, seek medical help quickly.  Extra  Please speak with your doctor, nurse, or pharmacist if you have any questions about this medicine.  [email or web address]  IMPORTANT NOTE: This document tells you briefly how to take your medicine, but it does not tell you all there is to know about it.Your doctor or pharmacist may give you other documents about your medicine. Please talk to them if you have any questions.Always follow their advice. There is a more complete description of this medicine available in English.Scan this code on your smartphone or tablet or use the web address below. You can also ask your pharmacist for a printout. If you have any questions, please ask your pharmacist.     2021 [facility] [employer]        Discharge Instructions for Deep Vein Thrombosis (DVT)  A blood clot or thrombus that forms in a large, deep vein is called a deep vein thrombosis (DVT). If a DVT is not treated, part of the clot (embolus) can break off and travel to your lungs. This is called a pulmonary embolus (PE). This can cut off the flow of blood to part or all of the lung. PE is a  medical emergency and may cause death.   Healthcare providers use the term venous thromboembolism (VTE) to describe these two conditions: DVT and PE. They use the term VTE because the two conditions are very closely related. And because their prevention and treatment are also closely related.   Follow all instructions for taking your medicine, follow-up care, and diet and lifestyle changes.  Medicine  Your healthcare provider will usually prescribe a blood-thinner (anticoagulant) medicine. This medicine helps prevent new blood clots. Blood thinners can be given by mouth (oral), by shot (injection), or into your vein (intravenous or IV). Commonly used blood thinners include warfarin and heparin. Newer blood thinners may also be used. They include rivaroxaban, apixaban, dabigatran, and enoxaparin. Your healthcare provider will give specific instructions on how to take your medicine. You may take more than one type for a period of time.  Take your blood thinner exactly as directed. If you miss a dose, call your healthcare provider to find out what you should do. These medicines increase the chance of bleeding. So it's very important to take them correctly. Be sure to tell all of your healthcare providers, including dentists, that you are taking a blood thinner.  Follow-up monitoring  You ll need to have your blood tested on a regular schedule. Your healthcare provider will tell you how often you need to have your blood tested. This is to make sure you re taking the right amount of warfarin. Too much can cause excess bleeding, which can be very serious. Too little may not prevent blood clots from harming you.  The blood tests check your international normalized ratio (INR) and prothrombin time (PT). These show how quickly your blood clots. Together the test is called PT/INR.  You may need to visit a hospital or clinic to have your blood tested. Or a nurse may come to your home and test your blood. In some cases, you may  be able to test your blood at home with a small machine. Talk with your healthcare provider to find out what s best for you. Don't miss any appointments to get your blood tested. If you have a blood test outside of your healthcare provider s office, make sure to call him or her as soon as you get your test results.  After the blood test, your healthcare provider may tell you to change your dose of warfarin. Take the medicine exactly as directed. Don t stop taking it unless your healthcare provider tells you to.  Diet and warfarin  Vitamin K can interact with warfarin and reduce its ability to thin your blood. Vitamin K helps your blood clot. So sudden changes in vitamin K intake can affect the way warfarin works. You don t need to stop eating foods with vitamin K. Instead, keep the amount you eat about the same each day. Foods high in vitamin K include:    Leafy green vegetables such as spinach, cabbage, and kale    Avocado    Asparagus    Egg yolks    Oils like canola, olive, and soybean  When taking warfarin, don't change your diet without first checking with your healthcare provider.  The other blood thinners don't have the same interaction with vitamin K that warfarin does.   Medicines and your anticoagulant  Some medicines may cause problems with blood thinners. Check with your healthcare provider before making any changes to your medicines. And don't take over-the-counter (OTC) medicines without checking with your provider. Some medicines interact with your blood thinner and make your blood too thin. This increases your risk of bleeding. Others may stop your blood thinner from doing its job, making your blood too thick. So it's very important to tell your healthcare provider about all of the medicines you take, including OTCs and herbal supplements. Don't start or stop taking any medicine, including OTCs, unless your healthcare provider tells you to.  Medicines that may cause problems with your blood thinner  include:    Some antibiotics    Some heart medicines    Cimetidine    Aspirin or other nonsteroidal anti-inflammatory drugs (NSAIDs) such as ibuprofen or naproxen.    Some medicines for depression, cancer, HIV infection, diabetes, seizures, gout, high cholesterol, or thyroid disease    Vitamins with vitamin K    Some herbal products such as [place]'s wort, garlic, coenzyme Q10, turmeric, and ginkgo biloba  Home care  To help prevent blood clots, try the following:    Wiggle your toes and move your ankles while sitting or lying down.    When traveling by car, make frequent stops to get up and move around.    On long airplane rides, get up and move around when possible. If you can t get up, wiggle your toes, move your ankles and tighten your calves to keep your blood moving.    If you have to stay in bed, do leg exercises.    Wear support or compression stockings, if prescribed by your healthcare provider.    Rest and put your legs up whenever they feel swollen or heavy.    Raise the foot of your mattress 5 to 6 inches, using a foam wedge.  Lifestyle changes  To help you stay healthy, especially your heart and blood vessels, you should:    Start an exercise program, if you are not exercising. Ask your healthcare provider how to get started. Try walking, inside or out.    Stay at a healthy weight. Get help to lose any extra pounds (kilograms).    Keep blood pressure in a healthy range    If you smoke, make a plan to quit. Ask your healthcare provider about stop-smoking programs to help you quit.  Call 911  Call 911 right away if you have the following symptoms. They may mean a blood clot in your lungs:    Chest pain    Trouble breathing    Fast heartbeat    Sweating    Fainting    Coughing (may cough up blood)    Heavy or uncontrolled bleeding  When to call your healthcare provider  Call your healthcare provider if you have pain, swelling, or redness in your leg, arm, or other area. These symptoms may mean a blood  clot.  If you take blood thinners and are bleeding, you may have:    Blood in the urine     Bleeding with bowel movements    Very dark or tar-like stool    Vomiting with blood    Coughing with blood    Bleeding from the nose    Bleeding from the gums    A cut that will not stop bleeding    Bleeding from the vagina  [employer] last reviewed this educational content on 10/1/2019    9304-9153 [employer] All rights reserved. This information is not intended as a substitute for professional medical care. Always follow your healthcare professional's instructions.      You will go home on Eliquis (this was the less expensive option) which is the blood thinner to prevent further clot formation given your pulmonary embolisms.  You will need to be on the Eliquis for approximately 6 months.  Please follow up with your primary care doctor for a refill of this medication (you have been given one month upon discharge from the hospital.    You need to avoid NSAIDS (aspirin, Ibuprofen, Naproxen) while on Eliquis as taking these with Eliquis will increase your bleeding risk.  Tylenol is safe to take with Eliquis.    Eliquis:  $494/mo. Upon receipt of RX [facility]charge Pharm[facility] can provide copay savings card to reduce this to $10 per month.  The card covers a maximum of $3,800 per year.          [employer] Video Sheets  Disinfecting Your Home of COVID-19  Someone in your household has a COVID-19 infection. You're worried it could spread to you or other family members. Let's learn how to clean your home and lower your risk of infection.  To watch the video:  Scan the QR code  Using your mobile device, scan the following code:       OR  Go to the website:  www.SmartThings  Enter the prescription code:   56L     2020 [employer]        Coronavirus Disease 2019 (COVID-19): Caring for Yourself or Others   If you or a household member have symptoms of COVID-19, follow the guidelines below. This will help you manage  symptoms and keep the virus from spreading.  If you have symptoms of COVID-19    Stay home and contact your care team. They will tell you what to do. You may be told to stay home and away from others (self-isolate). You may also be told to stay at least 6 feet from others (social distancing).    Stay away from work, school, and public places.    Limit physical contact with others in your home. Limit visitors. No kissing.  Clean surfaces you touch with disinfectant.  If you need to cough or sneeze, do it into a tissue. Then throw the tissue into the trash. If you don't have tissues, cough or sneeze into the bend of your elbow.  Don t share food or personal items with people in your household. This includes items like eating and drinking utensils, towels, and bedding.  Wear a cloth face mask around other people. During a public health emergency, medical face masks may be reserved for healthcare workers. You may need to make a cloth face mask of your own. You can do this using a bandana, T-shirt, or other cloth. The CDC has instructions on how to make a face mask. Wear the mask so that it covers both your nose and mouth.  If you need to go to a hospital or clinic, call ahead to let them know. Expect the care team to wear masks, gowns, gloves, and eye protection. You may need to come to a different entrance or wait in a separate room.  Follow all instructions from your care team.    If you ve been diagnosed with COVID-19    Stay home and away from others, including other people in your home. (This is called self-isolation.) Don t leave home unless you need to get medical care. Don t go to work, school, or public places. Don t use buses, taxis, or other public transportation.    Follow all instructions from your care team.    If you need to go to a hospital or clinic, call ahead to let them know. Expect the care team to wear masks, gowns, gloves, and eye protection. You may need to come to a different entrance or wait in  a separate room.    Wear a face mask over your nose and mouth. This is to protect others from your germs. If you can t wear a mask, your caregivers should wear one. You may need to make your own mask using a bandana, T-shirt, or other cloth. See the [facility] s instructions on how to make a face mask.    Have no contact with pets and other animals.    Don t share food or personal items with people in your household. This includes items like eating and drinking utensils, towels, and bedding.    If you need to cough or sneeze, do it into a tissue. Then throw the tissue into the trash. If you don't have tissues, cough or sneeze into the bend of your elbow.    Wash your hands often.    Self-care at home  The FDA has approved an antiviral medicine (called remdesivir) for people being treated in the hospital. This is for people 12 years and older who weigh more than about 88 pounds (40 kgs). In certain cases, it may also be used for people younger than 12 years or who weigh less than about 88 pounds (40 kgs)..  Currently, treatment is mostly aimed at helping your body while it fights the virus.    Getting extra rest.    Drink extra fluids 6 to 8 glasses of liquids each day), unless a doctor has told you not to. Ask your care team which fluids are best for you. Avoid fluids that contain caffeine or alcohol.    Taking over-the-counter (OTC) medicine to reduce pain and fever. Your care team will tell you which medicine to use.  If you ve been in the hospital for COVID-19, follow your care team s instructions. They will tell you when to stop self-isolation. They may also have you change positions to help your breathing (such as lying on your belly).  If a test showed that you have COVID-19, you may be asked to donate plasma after you ve recovered. (This is called COVID-19 convalescent plasma donation.) The plasma may contain antibodies to help fight the virus in others. Experts don't know the safety of plasma or how well it works.  Research continues, and the FDA has approved it for emergency use in certain people with serious or life-threatening COVID-19.  Caring for a sick person     Follow all instructions from the care team.    Wash your hands often.    Wear protective clothing as advised.    Make sure the sick person wears a mask. If they can't wear a mask, don t stay in the same room with them. If you must be in the same room, wear a face mask. Make sure the mask covers both the nose and mouth.    Keep track of the sick person s symptoms.    Clean surfaces often with disinfectant. This includes phones, kitchen counters, fridge door handles, bathroom surfaces, and others.    Don t let anyone share household items with the sick person. This includes eating and drinking tools, towels, sheets, or blankets.    Clean fabrics and laundry well.    Keep other people and pets away from the sick person.    When you can stop self-isolation  When you are sick with COVID-19, you should stay away from other people. This is called self-isolation. The rules for ending self-isolation depend on your health in general.  If you are normally healthy:  You can stop self-isolation when all 3 of these are true:    You ve had no fever--and no medicine that reduces fever--for at least 24 hours. And     Your symptoms are better, such as cough or trouble breathing. And     At least 10 days have passed since your symptoms first started.  Talk with your care team before you leave home. They may tell you it s okay to leave, or they may give you different advice. You do not need to be re-tested.  If you have a weak immune system, or you ve had severe COVID-19:  Follow your care team s instructions. You may be asked to self-isolate for 10 days to 20 days after your symptoms first started. Your care team may want to re-test you for COVID-19.  Note: If you re being treated for cancer, have an immune disorder (such as HIV), or have had a transplant (organ or bone marrow),  you may have a weak immune system.  If you've already had COVID-19 once:  If you had the virus over 3 months ago, and you ve been exposed again, treat it like you've never had COVID-19. Stay home, limit your contact with others, call your care team, and watch for symptoms.  If it s been less than 3 months since you had the virus, you re symptom-free, and you ve been exposed again: You don t need to self-isolate. You don t need to be re-tested, unless you have new symptoms of COVID-19 that can t be linked to another illness. But if you develop new symptoms, stay home. Contact your care team if you have questions.  When you return to public settings  When you are well enough to go outside your home, follow the CDC s guidance on cloth face masks.    Anyone over age 2 should wear a face mask in public, especially when it's hard to socially distance. This includes public transit, public protests and marches, and crowded stores, bars, and restaurants.    Face masks are most likely to reduce the spread of COVID-19 when they are widely used by people who are out in the public.  Certain people should not wear a face covering. These include:    Children under 2 years old    Anyone with a health, developmental, or mental health condition that can be made worse by wearing a mask    Anyone who is unconscious or unable to remove the face covering without help. See the CDC's guidance on who should not wear a face mask.  When to call your care team  Call your care team right away if a sick person has any of these:    Trouble breathing    Pain or pressure in chest  If a sick person has any of these, call 911:    Trouble breathing that gets worse    Pain or pressure in chest that gets worse    Blue tint to lips or face    Fast or irregular heartbeat    Confusion or trouble waking    Fainting or loss of consciousness    Coughing up blood  Going home from the hospital   If you have COVID-19 and were recently in the hospital:    Follow  the instructions above for self-care and isolation.    Follow the hospital care team s instructions.    Ask questions if anything is unclear to you. Write down answers so you remember them.  Date last modified: 1/15/2021  Isaac last reviewed this educational content on 4/1/2020  This information has been modified by your health care provider with permission from the publisher.    0716-1249 [employer]. [address] [place] [address]. All rights reserved. This information is not intended as a substitute for professional medical care. Always follow your healthcare professional's instructions.      As you recover from COVID-19    Patients who have symptoms (cough, fever, or shortness of breath), need to isolate for 10 days from when symptoms started AND 72 hours after fever resolves (without fever reducing medications) AND improvement of respiratory symptoms (whichever is longer).    During this time:    Isolate yourself at home (in own room/own bathroom if possible)    Do not allow any visitors    Do not go to work or school    Do not go to [employer],  centers, shopping, or other public places    Do not shake hands    Avoid close and intimate contact with others (hugging, kissing)    Follow CDC recommendations for household cleaning of frequently touched services    After the initial 10 days, continue to isolate yourself from household members as much as possible. To continue decrease the risk of community spread and exposure, you and any members of your household should limit activities in public for 14 days after starting home isolation.     You can reference the following CDC link for helpful home isolation/care tips:  https://www.cdc.gov/coronavirus/2019-ncov/downloads/10Things.pdf    Protect Others:    Cover your mouth and nose with a mask, disposable tissue or wash cloth to avoid spreading germs.    Wash your hands and face often. Use soap and water    Call Back If: Breathing  difficulty develops or you become worse.      For more information about COVID19 and options for caring for yourself at home, please visit the CDC website at https://www.cdc.gov/coronavirus/2019-ncov/about/steps-when-sick.html  For more options for care at [facility], please visit our website at [email or web address]    For information about [facility] COVID-19 Clinical Trials, please visit [email or web address] [facility]/umn-clinical-trials    For more information, please use the [place] Department of Health COVID-19 Website: [email or web address]  [place] Department of Health ([facility]) COVID-19 Hotlines (Interpreters  available):      Health questions: Phone Number: [phone] or [phone] and Hours: 7 a.m. to 7 p.m.    Schools and  questions: Phone Number: [phone] or [phone] and Hours 7 a.m. to 7 p.m.    Coronavirus Disease 2019 (COVID-19): Caring for Yourself or Others   If you or a household member have symptoms of COVID-19, follow the guidelines below. This will help you manage symptoms and keep the virus from spreading.  If you have symptoms of COVID-19    Stay home and contact your care team. They will tell you what to do. You may be told to stay home and away from others (self-isolate). You may also be told to stay at least 6 feet from others (social distancing).    Stay away from work, school, and public places.    Limit physical contact with others in your home. Limit visitors. No kissing.  Clean surfaces you touch with disinfectant.  If you need to cough or sneeze, do it into a tissue. Then throw the tissue into the trash. If you don't have tissues, cough or sneeze into the bend of your elbow.  Don t share food or personal items with people in your household. This includes items like eating and drinking utensils, towels, and bedding.  Wear a cloth face mask around other people. During a  public health emergency, medical face masks may be reserved for healthcare workers. You may need to make a cloth face mask of your own. You can do this using a bandana, T-shirt, or other cloth. The [facility] has instructions on how to make a face mask. Wear the mask so that it covers both your nose and mouth.  If you need to go to a hospital or clinic, call ahead to let them know. Expect the care team to wear masks, gowns, gloves, and eye protection. You may need to come to a different entrance or wait in a separate room.  Follow all instructions from your care team.    If you ve been diagnosed with COVID-19    Stay home and away from others, including other people in your home. (This is called self-isolation.) Don t leave home unless you need to get medical care. Don t go to work, school, or public places. Don t use buses, taxis, or other public transportation.    Follow all instructions from your care team.    If you need to go to a hospital or clinic, call ahead to let them know. Expect the care team to wear masks, gowns, gloves, and eye protection. You may need to come to a different entrance or wait in a separate room.    Wear a face mask over your nose and mouth. This is to protect others from your germs. If you can t wear a mask, your caregivers should wear one. You may need to make your own mask using a bandana, T-shirt, or other cloth. See the CDC s instructions on how to make a face mask.    Have no contact with pets and other animals.    Don t share food or personal items with people in your household. This includes items like eating and drinking utensils, towels, and bedding.    If you need to cough or sneeze, do it into a tissue. Then throw the tissue into the trash. If you don't have tissues, cough or sneeze into the bend of your elbow.    Wash your hands often.    Self-care at home  The FDA has approved an antiviral medicine (called remdesivir) for people being treated in the hospital. This is for people 12  years and older who weigh more than about 88 pounds (40 kgs). In certain cases, it may also be used for people younger than 12 years or who weigh less than about 88 pounds (40 kgs)..  Currently, treatment is mostly aimed at helping your body while it fights the virus.    Getting extra rest.    Drink extra fluids 6 to 8 glasses of liquids each day), unless a doctor has told you not to. Ask your care team which fluids are best for you. Avoid fluids that contain caffeine or alcohol.    Taking over-the-counter (OTC) medicine to reduce pain and fever. Your care team will tell you which medicine to use.  If you ve been in the hospital for COVID-19, follow your care team s instructions. They will tell you when to stop self-isolation. They may also have you change positions to help your breathing (such as lying on your belly).  If a test showed that you have COVID-19, you may be asked to donate plasma after you ve recovered. (This is called COVID-19 convalescent plasma donation.) The plasma may contain antibodies to help fight the virus in others. Experts don't know the safety of plasma or how well it works. Research continues, and the FDA has approved it for emergency use in certain people with serious or life-threatening COVID-19.  Caring for a sick person     Follow all instructions from the care team.    Wash your hands often.    Wear protective clothing as advised.    Make sure the sick person wears a mask. If they can't wear a mask, don t stay in the same room with them. If you must be in the same room, wear a face mask. Make sure the mask covers both the nose and mouth.    Keep track of the sick person s symptoms.    Clean surfaces often with disinfectant. This includes phones, kitchen counters, fridge door handles, bathroom surfaces, and others.    Don t let anyone share household items with the sick person. This includes eating and drinking tools, towels, sheets, or blankets.    Clean fabrics and laundry  well.    Keep other people and pets away from the sick person.    When you can stop self-isolation  When you are sick with COVID-19, you should stay away from other people. This is called self-isolation. The rules for ending self-isolation depend on your health in general.  If you are normally healthy:  You can stop self-isolation when all 3 of these are true:    You ve had no fever--and no medicine that reduces fever--for at least 24 hours. And     Your symptoms are better, such as cough or trouble breathing. And     At least 10 days have passed since your symptoms first started.  Talk with your care team before you leave home. They may tell you it s okay to leave, or they may give you different advice. You do not need to be re-tested.  If you have a weak immune system, or you ve had severe COVID-19:  Follow your care team s instructions. You may be asked to self-isolate for 10 days to 20 days after your symptoms first started. Your care team may want to re-test you for COVID-19.  Note: If you re being treated for cancer, have an immune disorder (such as HIV), or have had a transplant (organ or bone marrow), you may have a weak immune system.  If you've already had COVID-19 once:  If you had the virus over 3 months ago, and you ve been exposed again, treat it like you've never had COVID-19. Stay home, limit your contact with others, call your care team, and watch for symptoms.  If it s been less than 3 months since you had the virus, you re symptom-free, and you ve been exposed again: You don t need to self-isolate. You don t need to be re-tested, unless you have new symptoms of COVID-19 that can t be linked to another illness. But if you develop new symptoms, stay home. Contact your care team if you have questions.  When you return to public settings  When you are well enough to go outside your home, follow the CDC s guidance on cloth face masks.    Anyone over age 2 should wear a face mask in public, especially  when it's hard to socially distance. This includes public transit, public protests and marches, and crowded stores, bars, and restaurants.    Face masks are most likely to reduce the spread of COVID-19 when they are widely used by people who are out in the public.  Certain people should not wear a face covering. These include:    Children under 2 years old    Anyone with a health, developmental, or mental health condition that can be made worse by wearing a mask    Anyone who is unconscious or unable to remove the face covering without help. See the CDC's guidance on who should not wear a face mask.  When to call your care team  Call your care team right away if a sick person has any of these:    Trouble breathing    Pain or pressure in chest  If a sick person has any of these, call 911:    Trouble breathing that gets worse    Pain or pressure in chest that gets worse    Blue tint to lips or face    Fast or irregular heartbeat    Confusion or trouble waking    Fainting or loss of consciousness    Coughing up blood  Going home from the hospital   If you have COVID-19 and were recently in the hospital:    Follow the instructions above for self-care and isolation.    Follow the hospital care team s instructions.    Ask questions if anything is unclear to you. Write down answers so you remember them.  Date last modified: 1/15/2021  [employer] last reviewed this educational content on 4/1/2020  This information has been modified by your health care provider with permission from the publisher.    7223-9545 [employer]. [address] [place] [address], [place] [address]. All rights reserved. This information is not intended as a substitute for professional medical care. Always follow your healthcare professional's instructions.        COVID-19: Lying in a Prone Position (Proning)  When you have COVID-19, lying on your belly can help your lungs work better. It can help get more oxygen into your lungs more easily. It can  help prevent lung injury. Lying on your belly is known as the prone position. You may also hear it called  proning.  If you re in the hospital, the healthcare team may position you to help your lungs. Once you are strong enough, your healthcare team may want you to do these position changes on your own.  How does proning help you breathe?  Most of your lung tissue sits closer to your back than to your front. Lying on your back (supine) can put pressure on your lung tissue. This can make the small air sacs in your lungs need to work harder to inflate. If you have to breathe harder to get enough air in your lungs, this can make lung problems worse. It can also cause lung injury.  But lying on your stomach (prone) can help your lungs work better with less stress. It can help prevent problems such as collapsed lung. This is when the air sacs in the lung can t inflate, or they may fill with fluid. It can happen to part or all of one or both lungs. Lying on your side can also help your lungs work better. Part of your time in bed will be spent on your side as well.  If you're in the hospital  If you re awake and still in the hospital, the healthcare team will help position you if needed. They can show you the safest ways to turn over while you re connected to tubes such as an IV. Your blood oxygen level (oxygen saturation, or O2 sat) may be checked often. This is to make sure your lungs are getting enough oxygen into your body. Your O2 sat level may be checked after each time you change position.  Getting ready for proning at home  Before you do prone positioning at home, tell your healthcare provider if you have any of these:    Neck, spine, or pelvic pain or other problems    Acid reflux from your stomach (heartburn or GERD)    Nausea or vomiting  You ll need:    A bed surface that can be flat    Pillows    A towel you can roll up  Before you get into bed:    Use the bathroom. This is so you don t have to get up soon  after you re lying down.    Make sure you have things in reach that you need. This includes your phone, TV remote, book or magazine, or a bell to ring for a family member.  Changing positions over time  You will need to cycle through these positions. Repeat this cycle as often as your healthcare team advises. Do them in this order:  Proning cycle       Position 1: Belly. Lie on your belly on a flat bed. Do this for 30 minutes to 2 hours.       Position 2: Right side. Lie on your right side on a flat bed. Do this for 30 minutes to 2 hours.         Position 3: Sitting up. Sit up in bed at a 30- to 60-degree angle. You can prop up the head of your bed with blocks, or prop yourself up in bed with pillows. Do this for 30 minutes to 2 hours.       Position 4: Left side. Lie on your left side on a flat bed. Do this for 30 minutes to 2 hours.         Position 5: Belly. Lie on your belly on a flat bed. Do this for 30 minutes to 2 hours.        Making proning more comfortable for you    Place pillows under your hips or lower legs for comfort as needed. If you have large breasts or a large belly, you can place pillows on your upper chest and pelvis to help support these areas while prone.    Put a pillow under your head. Turn your head from side to side at least once every 30 minutes, or more often as needed.    If you have neck problems, you can fold a towel into a horseshoe shape to support your face. This will let you lie face down without turning your head to the side.    Try putting your arms in different positions to see what is most comfortable. To start, try putting 1 arm bent and the other straight.    Don t lie on your stomach if you ve just had a meal. This can cause stomach acid reflux. Try to wait a couple of hours after eating before you lie on your belly.    Change position slowly and carefully. If you have an IV or other tubes, make sure to not pull on them.    Change position more often if you are at risk for  pressure sores (ulcers).    Follow your healthcare team's instructions  The information above is a guideline. Make sure to follow your healthcare team s specific instructions. They may tell you:    When to do proning    How often to do proning    How often to change position  When to call your healthcare provider  Call your healthcare provider if you have any of these:    Breathing that gets worse    New or worse neck, spine, or pelvic pain from proning    New or worse acid reflux    New chest pain  [employer] last reviewed this educational content on 6/1/2020 2000-2021 [employer]. All rights reserved. This information is not intended as a substitute for professional medical care. Always follow your healthcare professional's instructions.

## 2021-04-26 NOTE — DISCHARGE SUMMARY
[facility]  Discharge Summary  Name: Cooper Adair    MRN: [id number]  YOB: 1980    Age: 40 year old  Date of Discharge:  4/26/2021  Date of Admission: 4/21/2021  Primary Care Provider: Chrissy Maguire  Discharge Physician:  Mercy Sparks MD  Discharging Service:  Hospitalist      Discharge Diagnoses:  1.  Acute hypoxic respiratory failure and sepsis secondary to COVID-19 viral pneumonia  2.  Transaminitis  3.  Acute bilateral pulmonary emboli  4.  JAREN  5.  Hypertension  6.  Obesity  7.  Hypokalemia  8.  Hyperglycemia with new diagnosis of prediabetes     Follow-ups Needed After Discharge   Follow-up with PCP in 2 to 3 weeks    Unresulted Labs Ordered in the Past 30 Days of this Admission     No orders found from 10/16/2018 to 12/16/2018.        Hospital Course:  Cooper Adair is a 40 year old male with past medical history of JAREN on CPAP, hypertension and obesity who was admitted on 4/21/2021 with progressive shortness of breath and malaise in the setting of known COVID-19 infection (diagnosed on 4/14) and was found to have acute hypoxic respiratory failure due to COVID-19 viral pneumonia in addition to acute bilateral PEs.    By the day of discharge he was no longer requiring supplemental oxygen.     Acute hypoxemic respiratory failure and sepsis secondary to COVID-19: Traveled to [place] about 2 weeks PTA.  Had symptoms of generalized malaise that started 10 days PTA.  Patient tested positive for COVID-19 on 04/14.  Symptoms progressed and ultimately admitted when he was found to have hypoxia.  Labs show mild thrombocytopenia and leukopenia, CT does show bilateral infiltrates consistent with COVID.  He required supplemental oxygen but this resolved prior to discharge.  He completed a course of remdesivir and will be discharged to complete a total of a 10-day course of Decadron 6 mg daily.  He will also be on a PPI for prophylaxis in the setting of steroid  "use.     Transaminitis: Fairly mild and likely due to COVID infection.  No abdominal pain.    I am recommending that he follow-up with his PCP and repeat LFTs to ensure that this is normalized.     Acute bilateral pulmonary embolism: CT showed moderate volume acute bilateral PE involving both pulmonary artery bifurcations and extending into multiple segmental and subsegmental pulmonary arterial branches throughout the lungs.  TTE showed normal EF without significant right heart strain.  Troponin was not elevated.  He was started on heparin drip, in addition to Xarelto but on discharge will be on Eliquis as this is a less expensive medication.  I discussed with him that he will likely need to be on this for 6 months.  Follow-up with primary care for ongoing refills of this medication.  Also instructed him to avoid NSAIDs.     JAREN: Has been using hospital BiPAP due to need for oxygen.    Prior to discharge he used his home CPAP machine without oxygen and did well with this.     HTN: Continue home losartan     Obesity: Complicates cares.     Hypokalemia: Replace per protocol.     Hyperglycemia, prediabetes: Hemoglobin A1c is 6.  I discussed with the patient as well as his wife by phone that this is consistent with prediabetes.  He will need to follow-up with his primary care doctor in the outpatient setting.        Discharge Disposition:  Discharged to home     Allergies:  No Known Allergies     Condition on Discharge:  Discharge condition: Stable   Discharge vitals: Blood pressure 129/68, pulse 54, temperature 98.7  F (37.1  C), temperature source Temporal, resp. rate 18, height 1.93 m (6' 4\"), weight (!) 153.1 kg (337 lb 9.6 oz), SpO2 99 %.   Code status on discharge: Full Code     History of Illness:  See detailed admission note for full details.    Physical Exam:  Blood pressure 129/68, pulse 54, temperature 98.7  F (37.1  C), temperature source Temporal, resp. rate 18, height 1.93 m (6' 4\"), weight (!) 153.1 kg " (337 lb 9.6 oz), SpO2 99 %.  Wt Readings from Last 1 Encounters:   04/26/21 (!) 153.1 kg (337 lb 9.6 oz)     General: Alert, awake, no acute distress.  HEENT: Normocephalic, atraumatic, eyes anicteric and without scleral injection, EOMI, MMM.  Cardiac: RRR, normal S1, S2.  No m/g/r. No LE edema.  Pulmonary: Normal chest rise, normal work of breathing.  Lungs CTAB  Abdomen: soft, non-tender, non-distended.  Normoactive BS.  No guarding or rebound tenderness.  Extremities: no deformities.  Warm, well perfused.  Skin: no rashes or lesions noted.  Warm and Dry.  Neuro: No focal deficits noted.  Speech clear.  Coordination and strength grossly normal.  Psych: Appropriate affect. Alert and oriented x3    Procedures other than Imaging:  Supplemental oxygen  Heparin drip  BiPAP     Imaging:  Results for orders placed or performed during the hospital encounter of 04/21/21   XR Chest Port 1 View    Narrative    CHEST PORTABLE ONE VIEW    4/21/2021 12:55 PM     HISTORY: Dyspnea/SOB, known COVID +.    COMPARISON: None available.      Impression    IMPRESSION: Single portable AP view of the chest was obtained.  Cardiomediastinal silhouette is within normal limits. Bilateral  basilar predominant pulmonary opacities, worrisome for infection  including atypical infection like COVID-19. No significant pleural  effusion or pneumothorax.     BETY RITCHIE MD   CT Chest Pulmonary Embolism w Contrast    Narrative    EXAM: CT CHEST PULMONARY EMBOLISM W CONTRAST  LOCATION: [facility]  DATE/TIME: 4/21/2021 5:29 PM    INDICATION: COVID-19 positive. Recent long travel with hypoxia and shortness of breath.  COMPARISON: None.  TECHNIQUE: CT chest pulmonary angiogram during arterial phase injection of IV contrast. Multiplanar reformats and MIP reconstructions were performed. Dose reduction techniques were used.   CONTRAST:  90mL [id number]-[id number]    FINDINGS:  ANGIOGRAM CHEST: There is nonocclusive embolus both pulmonary artery  bifurcations with linear nonocclusive extension into the distal right pulmonary artery. Extension into multiple segmental and subsegmental pulmonary arterial branches throughout both   lungs. Borderline enlargement of the central pulmonary arteries. No right ventricular dilatation.    LUNGS AND PLEURA: Multiple peripheral regions of geographic and nodular groundglass infiltrates in the lungs. There are regions of intralobular thickening, and findings are consistent with COVID-19 pneumonia. Small left pleural effusion.    MEDIASTINUM/AXILLAE: Mild mediastinal and hilar lymphadenopathy which is likely reactive.    CORONARY ARTERY CALCIFICATION: None.    UPPER ABDOMEN: Normal.    MUSCULOSKELETAL: Normal.      Impression    IMPRESSION:  1.  Moderate volume acute bilateral pulmonary embolism involving both pulmonary artery bifurcations and extending into multiple segmental and subsegmental pulmonary arterial branches throughout the lungs. There is borderline enlargement of the central   pulmonary arteries without right ventricular dilatation.    2.  Pulmonary infiltrates typical COVID-19 pneumonia. Mediastinal and hilar lymphadenopathy likely reactive. Small left pleural effusion.    Results given to Dr Varghese   Echocardiogram Limited    Narrative    [id number]  [id number]  [id number]  [id number]^TOM^HANNAH     [facility]  Echocardiography Laboratory  [address] [place] [address]     Name: HAYES YE  MRN: [id number]  : 1980  Study Date: 2021 11:30 AM  Age: 40 yrs  Gender: Male  Patient Location: [facility]  Reason For Study: Pulmonary Embolism  Ordering Physician: HANNAH VARGHESE  Referring Physician: Chrissy Maguire  Performed By: Madina Khan RDCS     BSA: 2.8 m2  Height: 76 in  Weight: 333 lb  HR: 85  BP: 156/97 mmHg  ______________________________________________________________________________  Procedure  Limited Portable Echo Adult. [employer] (NDC #[id number]) given  intravenously.  ______________________________________________________________________________  Interpretation Summary     Technically difficult imaging.  The right ventricle is normal in structure, function and size.  Right ventricle systolic pressure estimate normal  The left ventricle is normal in structure, function and size.  The visual ejection fraction is estimated at 60-65%.  Doppler interrogation does not demonstrate significant stenosis or  insufficieincy involving cardiac valves.     No old studies avialble for compariosn  ______________________________________________________________________________  Left Ventricle  The left ventricle is normal in structure, function and size. There is normal  left ventricular wall thickness. The visual ejection fraction is estimated at  60-65%. Left ventricular diastolic function is normal. No regional wall motion  abnormalities noted. There is no thrombus seen in the left ventricle.     Right Ventricle  The right ventricle is normal in structure, function and size. There is no  mass or thrombus in the right ventricle.     Atria  Normal left atrial size. Right atrial size is normal. There is no atrial shunt  seen. The left atrial appendage is not well visualized.     Mitral Valve  The mitral valve leaflets appear normal. There is no evidence of stenosis,  fluttering, or prolapse. There is no mitral regurgitation noted. There is no  mitral valve stenosis.     Tricuspid Valve  The tricuspid valve is not well visualized, but is grossly normal. The right  ventricular systolic pressure is approximated at 26.4 mmHg plus the right  atrial pressure. Right ventricle systolic pressure estimate normal. There is  mild (1+) tricuspid regurgitation. There is no tricuspid stenosis.     Aortic Valve  The aortic valve is trileaflet. No aortic regurgitation is present. No aortic  stenosis is present.     Pulmonic Valve  The pulmonic valve is not well visualized. There is no pulmonic  valvular  regurgitation. There is no pulmonic valvular stenosis.     Vessels  The aortic root is normal size. Normal size ascending aorta. Inferior vena  cava not well visualized for estimation of right atrial pressure. The  pulmonary artery is normal size.     Pericardium  The pericardium appears normal. There is no pleural effusion.     Rhythm  Sinus rhythm was noted.  ______________________________________________________________________________  MMode/2D Measurements & Calculations  asc Aorta Diam: 3.2 cm     Doppler Measurements & Calculations  MV E max vic: 74.2 cm/sec  MV A max vic: 56.3 cm/sec  MV E/A: 1.3  MV max P.8 mmHg  MV mean P.6 mmHg  MV V2 VTI: 25.2 cm  MV dec time: 0.25 sec  TR max vic: 256.9 cm/sec  TR max P.4 mmHg     ______________________________________________________________________________  Report approved by: Dr. Christopher Truong 2021 02:08 PM                Consultations:  Consultations This Hospital Stay   PHARMACY IP CONSULT  PHARMACY IP CONSULT  PHARMACY LIAISON FOR MEDICATION COVERAGE CONSULT  PHARMACY LIAISON FOR MEDICATION COVERAGE CONSULT     Recent Lab Results:  Recent Labs   Lab 21  0708 21  0657 21  0418   WBC 6.7 6.5 3.2*   HGB 12.8* 13.4 15.6   HCT 40.6 41.3 48.3   MCV 91 89 90    209 129*     Recent Labs   Lab 21  0644 21  0708 21  0657    140 139   POTASSIUM 4.0 3.8 3.6   CHLORIDE 107 109 108   CO2 31 29 28   ANIONGAP 3 2* 3   GLC 96 104* 113*   BUN 13 12 13   CR 0.79 0.84 0.71   GFRESTIMATED >90 >90 >90   GFRESTBLACK >90 >90 >90   CIRILO 8.8 8.5 8.2*   PROTTOTAL 7.0 6.4* 6.5*   ALBUMIN 3.1* 2.9* 2.8*   BILITOTAL 0.5 0.5 0.5   ALKPHOS 60 55 53   AST 88* 91* 56*   * 163* 106*     Recent Labs   Lab 21  1240   DD 4.8*     Recent Labs   Lab 21  0644 21  0708 21  0657   CRP 4.6 6.1 9.2*     Recent Labs   Lab 21  1240   TROPI <0.015          Pending Results:    Unresulted Labs  Ordered in the Past 30 Days of this Admission     No orders found from 3/22/2021 to 4/22/2021.           Discharge Instructions and Follow-Up:   Discharge Orders      COVID-19 GetWell Loop Referral      Reason for your hospital stay    You were hospitalized for progressive shortness of breath due to COVID-19 infection.  You were also found to have pulmonary emboli (blood clots in the lungs) and have been started on a blood thinner to prevent further clots.     Contact provider    Contact your primary care provider if If increased trouble breathing, new arm/leg swelling, dizziness/passing out, falls, bleeding that doesn't stop, or uncontrolled pain.     Discharge - Quarantine/Isolation Instruction    Date of symptom onset:     Date of first positive test:    Stay home and away from others (self-isolate) for at least 20 days from when your symptoms started And...   You've had no fevers and no medicine that reduces fever for 1 full day (24 hours)  And...   Your other symptoms have resolved (gotten better).     Activity    Your activity upon discharge: activity as tolerated     Follow-up and recommended labs and tests     Follow up with primary care provider, Chrissy Maguire, within 2-3 weeks, for hospital follow- up and regarding new diagnosis. Please have your liver function checked (you had elevated liver function numbers which are likely related to COVID).  Also follow up on your new diagnosis of pre diabetes.     Diet    Follow this diet upon discharge: Orders Placed This Encounter      Combination Diet Regular Diet Adult     Discharge Medications   Current Discharge Medication List      START taking these medications    Details   Apixaban Starter Pack (ELIQUIS DVT/PE STARTER PACK) 5 MG TBPK Take 10 mg by mouth 2 times daily for 7 days, THEN 5 mg 2 times daily for 23 days.  Qty: 74 each, Refills: 0    Associated Diagnoses: Multiple subsegmental pulmonary emboli without acute cor pulmonale (H)      dexamethasone  (DECADRON) 6 MG tablet Take 1 tablet (6 mg) by mouth daily for 4 days  Qty: 4 tablet, Refills: 0    Associated Diagnoses: 2019 novel coronavirus disease (COVID-19)      famotidine (PEPCID) 10 MG tablet Take 1 tablet (10 mg) by mouth 2 times daily  Qty: 60 tablet, Refills: 0    Associated Diagnoses: 2019 novel coronavirus disease (COVID-19)         CONTINUE these medications which have NOT CHANGED    Details   albuterol (PROAIR HFA/PROVENTIL HFA/VENTOLIN HFA) 108 (90 Base) MCG/ACT inhaler Inhale 1-2 puffs into the lungs every 4 hours as needed    Comments: Pharmacy may dispense brand covered by insurance (Proair, or proventil or ventolin or generic albuterol inhaler)      losartan (COZAAR) 50 MG tablet Take 50 mg by mouth daily      topiramate (TOPAMAX) 50 MG tablet Take 50 mg by mouth 2 times daily             Time Spent on this Encounter   I, Mercy Sparks MD, personally saw the patient today and spent greater than 30 minutes discharging this patient.    Mercy Sparks MD